# Patient Record
Sex: FEMALE | Race: WHITE | NOT HISPANIC OR LATINO | Employment: OTHER | URBAN - METROPOLITAN AREA
[De-identification: names, ages, dates, MRNs, and addresses within clinical notes are randomized per-mention and may not be internally consistent; named-entity substitution may affect disease eponyms.]

---

## 2021-06-14 ENCOUNTER — TELEPHONE (OUTPATIENT)
Dept: CARDIOLOGY | Facility: CLINIC | Age: 76
End: 2021-06-14

## 2021-08-11 ENCOUNTER — TELEPHONE (OUTPATIENT)
Dept: CARDIOLOGY | Facility: CLINIC | Age: 76
End: 2021-08-11

## 2021-08-12 ENCOUNTER — OFFICE VISIT (OUTPATIENT)
Dept: CARDIOLOGY | Facility: CLINIC | Age: 76
End: 2021-08-12
Payer: MEDICARE

## 2021-08-12 VITALS
BODY MASS INDEX: 20.6 KG/M2 | DIASTOLIC BLOOD PRESSURE: 101 MMHG | HEART RATE: 104 BPM | SYSTOLIC BLOOD PRESSURE: 192 MMHG | HEIGHT: 60 IN | WEIGHT: 104.94 LBS

## 2021-08-12 DIAGNOSIS — E78.2 MIXED HYPERLIPIDEMIA: ICD-10-CM

## 2021-08-12 DIAGNOSIS — Q87.89: ICD-10-CM

## 2021-08-12 DIAGNOSIS — R09.89 LABILE HYPERTENSION: Primary | ICD-10-CM

## 2021-08-12 DIAGNOSIS — I10 ESSENTIAL HYPERTENSION: ICD-10-CM

## 2021-08-12 PROCEDURE — 99205 OFFICE O/P NEW HI 60 MIN: CPT | Mod: S$PBB,,, | Performed by: INTERNAL MEDICINE

## 2021-08-12 PROCEDURE — 99999 PR PBB SHADOW E&M-EST. PATIENT-LVL III: CPT | Mod: PBBFAC,,, | Performed by: INTERNAL MEDICINE

## 2021-08-12 PROCEDURE — 93010 EKG 12-LEAD: ICD-10-PCS | Mod: ,,, | Performed by: INTERNAL MEDICINE

## 2021-08-12 PROCEDURE — 99999 PR PBB SHADOW E&M-EST. PATIENT-LVL III: ICD-10-PCS | Mod: PBBFAC,,, | Performed by: INTERNAL MEDICINE

## 2021-08-12 PROCEDURE — 93005 ELECTROCARDIOGRAM TRACING: CPT | Mod: PO

## 2021-08-12 PROCEDURE — 99213 OFFICE O/P EST LOW 20 MIN: CPT | Mod: PBBFAC,PO | Performed by: INTERNAL MEDICINE

## 2021-08-12 PROCEDURE — 99205 PR OFFICE/OUTPT VISIT, NEW, LEVL V, 60-74 MIN: ICD-10-PCS | Mod: S$PBB,,, | Performed by: INTERNAL MEDICINE

## 2021-08-12 PROCEDURE — 93010 ELECTROCARDIOGRAM REPORT: CPT | Mod: ,,, | Performed by: INTERNAL MEDICINE

## 2021-08-12 RX ORDER — CLONIDINE HYDROCHLORIDE 0.1 MG/1
TABLET ORAL
COMMUNITY
Start: 2021-05-26 | End: 2021-09-15 | Stop reason: SDUPTHER

## 2021-08-12 RX ORDER — CARVEDILOL 12.5 MG/1
TABLET ORAL
COMMUNITY
Start: 2021-03-16 | End: 2021-08-12

## 2021-08-12 RX ORDER — PROPRANOLOL HYDROCHLORIDE 60 MG/1
60 CAPSULE, EXTENDED RELEASE ORAL DAILY
Qty: 90 CAPSULE | Refills: 3 | Status: SHIPPED | OUTPATIENT
Start: 2021-08-12 | End: 2021-10-25 | Stop reason: DRUGHIGH

## 2021-08-12 RX ORDER — LATANOPROST 50 UG/ML
SOLUTION/ DROPS OPHTHALMIC
COMMUNITY
Start: 2021-07-29 | End: 2024-02-06

## 2021-08-12 RX ORDER — METOPROLOL TARTRATE 25 MG/1
25 TABLET, FILM COATED ORAL DAILY
COMMUNITY
Start: 2021-07-29 | End: 2021-08-12

## 2021-08-12 RX ORDER — EZETIMIBE 10 MG/1
10 TABLET ORAL DAILY
COMMUNITY
Start: 2021-05-18

## 2021-08-12 RX ORDER — SODIUM, POTASSIUM,MAG SULFATES 17.5-3.13G
SOLUTION, RECONSTITUTED, ORAL ORAL
COMMUNITY
Start: 2021-04-30 | End: 2023-05-15

## 2021-08-12 RX ORDER — ALPRAZOLAM 0.25 MG/1
TABLET ORAL
COMMUNITY
Start: 2021-06-02

## 2021-08-12 RX ORDER — OMEPRAZOLE 20 MG/1
20 CAPSULE, DELAYED RELEASE ORAL
COMMUNITY
Start: 2021-08-03 | End: 2021-08-12

## 2021-08-12 RX ORDER — SIMVASTATIN 20 MG/1
TABLET, FILM COATED ORAL
COMMUNITY
Start: 2021-04-30

## 2021-08-24 DIAGNOSIS — I10 ESSENTIAL HYPERTENSION: Primary | ICD-10-CM

## 2021-09-07 ENCOUNTER — TELEPHONE (OUTPATIENT)
Dept: CARDIOLOGY | Facility: CLINIC | Age: 76
End: 2021-09-07

## 2021-09-15 RX ORDER — CLONIDINE HYDROCHLORIDE 0.1 MG/1
0.1 TABLET ORAL 2 TIMES DAILY
Qty: 180 TABLET | Refills: 3 | Status: SHIPPED | OUTPATIENT
Start: 2021-09-15 | End: 2022-12-01

## 2021-09-16 ENCOUNTER — CLINICAL SUPPORT (OUTPATIENT)
Dept: CARDIOLOGY | Facility: HOSPITAL | Age: 76
End: 2021-09-16
Attending: INTERNAL MEDICINE
Payer: MEDICARE

## 2021-09-16 DIAGNOSIS — R09.89 LABILE HYPERTENSION: ICD-10-CM

## 2021-09-16 LAB
ABDOMINAL AORTA PROX EDV: 7 CM/S
ABDOMINAL AORTA PROX PSV: 51 CM/S
LEFT RENAL DIST DIAS: 25 CM/S
LEFT RENAL DIST SYS: 84 CM/S
LEFT RENAL MID DIAS: 22 CM/S
LEFT RENAL MID SYS: 79 CM/S
LEFT RENAL ORIGIN DIAS: 26 CM/S
LEFT RENAL ORIGIN SYS: 84 CM/S
LEFT RENAL PROX DIAS: 21 CM/S
LEFT RENAL PROX RAR: 1.45
LEFT RENAL PROX SYS: 74 CM/S
LEFT RENAL ULTRASOUND ACCELERATION TIME MEASUREMENT 1: 66 MS
LEFT RENAL ULTRASOUND ACCELERATION TIME MEASUREMENT 2: 66 MS
LEFT RENAL ULTRASOUND ACCELERATION TIME MEASUREMENT 3: 63 MS
LEFT RENAL ULTRASOUND ACCELERATION TIME MEASUREMENT AVERAGE: 66 MS
LEFT RENAL ULTRASOUND KIDNEY SIZE MEASUREMENT 1: 9.5 CM
LEFT RENAL ULTRASOUND KIDNEY SIZE MEASUREMENT 2: 9.4 CM
LEFT RENAL ULTRASOUND KIDNEY SIZE MEASUREMENT 3: 9.4 CM
LEFT RENAL ULTRASOUND KIDNEY SIZE MEASUREMENT AVERAGE: 9.5 CM
LEFT RENAL ULTRASOUND RESISTIVE INDEX MEASUREMENT 1: 0.59
LEFT RENAL ULTRASOUND RESISTIVE INDEX MEASUREMENT 2: 0.66
LEFT RENAL ULTRASOUND RESISTIVE INDEX MEASUREMENT 3: 0.63
LEFT RENAL ULTRASOUND RESISTIVE INDEX MEASUREMENT AVERAGE: 0.66
OHS CV LEFT RENAL RAR: 1.65
OHS CV RIGHT RENAL RAR: 2.27
OHS CV US LEFT RENAL HIGHEST EDV: 26
OHS CV US LEFT RENAL HIGHEST PSV: 84
OHS CV US RIGHT RENAL HIGHEST EDV: 31
OHS CV US RIGHT RENAL HIGHEST PSV: 116
PROX AORTIC AP: 1.7 CM
PROX AORTIC TRANS: 1.7 CM
RIGHT RENAL DIST DIAS: 31 CM/S
RIGHT RENAL DIST SYS: 116 CM/S
RIGHT RENAL MID DIAS: 24 CM/S
RIGHT RENAL MID SYS: 110 CM/S
RIGHT RENAL ORIGIN DIAS: 24 CM/S
RIGHT RENAL ORIGIN SYS: 82 CM/S
RIGHT RENAL PROX DIAS: 28 CM/S
RIGHT RENAL PROX RAR: 2.14
RIGHT RENAL PROX SYS: 109 CM/S
RIGHT RENAL ULTRASOUND ACCELERATION TIME MEASUREMENT 1: 65 MS
RIGHT RENAL ULTRASOUND ACCELERATION TIME MEASUREMENT 2: 63 MS
RIGHT RENAL ULTRASOUND ACCELERATION TIME MEASUREMENT 3: 66 MS
RIGHT RENAL ULTRASOUND ACCELERATION TIME MEASUREMENT AVERAGE: 66 MS
RIGHT RENAL ULTRASOUND KIDNEY SIZE MEASUREMENT 1: 8.2 CM
RIGHT RENAL ULTRASOUND KIDNEY SIZE MEASUREMENT 2: 8.9 CM
RIGHT RENAL ULTRASOUND KIDNEY SIZE MEASUREMENT 3: 8.9 CM
RIGHT RENAL ULTRASOUND KIDNEY SIZE MEASUREMENT AVERAGE: 8.9 CM
RIGHT RENAL ULTRASOUND RESISTIVE INDEX MEASUREMENT 1: 0.77
RIGHT RENAL ULTRASOUND RESISTIVE INDEX MEASUREMENT 2: 0.7
RIGHT RENAL ULTRASOUND RESISTIVE INDEX MEASUREMENT 3: 0.64
RIGHT RENAL ULTRASOUND RESISTIVE INDEX MEASUREMENT AVERAGE: 0.77

## 2021-09-16 PROCEDURE — 93975 CV US RENAL ARTERY STENOSIS HYPERTENSION COMPLETE (CUPID ONLY): ICD-10-PCS | Mod: 26,,, | Performed by: INTERNAL MEDICINE

## 2021-09-16 PROCEDURE — 93975 VASCULAR STUDY: CPT | Mod: 26,,, | Performed by: INTERNAL MEDICINE

## 2021-09-16 PROCEDURE — 93975 VASCULAR STUDY: CPT | Mod: PO

## 2021-09-20 DIAGNOSIS — H91.90 HEARING DIFFICULTY, UNSPECIFIED LATERALITY: Primary | ICD-10-CM

## 2021-09-21 ENCOUNTER — CLINICAL SUPPORT (OUTPATIENT)
Dept: AUDIOLOGY | Facility: CLINIC | Age: 76
End: 2021-09-21
Payer: MEDICARE

## 2021-09-21 ENCOUNTER — OFFICE VISIT (OUTPATIENT)
Dept: OTOLARYNGOLOGY | Facility: CLINIC | Age: 76
End: 2021-09-21
Payer: MEDICARE

## 2021-09-21 VITALS — BODY MASS INDEX: 20.49 KG/M2 | WEIGHT: 104.94 LBS

## 2021-09-21 DIAGNOSIS — H81.09 MENIERE'S DISEASE, UNSPECIFIED LATERALITY: ICD-10-CM

## 2021-09-21 DIAGNOSIS — H90.3 BILATERAL SENSORINEURAL HEARING LOSS: Primary | ICD-10-CM

## 2021-09-21 DIAGNOSIS — H93.12 TINNITUS, LEFT: ICD-10-CM

## 2021-09-21 PROCEDURE — 99999 PR PBB SHADOW E&M-EST. PATIENT-LVL III: ICD-10-PCS | Mod: PBBFAC,,, | Performed by: OTOLARYNGOLOGY

## 2021-09-21 PROCEDURE — 92567 TYMPANOMETRY: CPT | Mod: PBBFAC,PO | Performed by: AUDIOLOGIST-HEARING AID FITTER

## 2021-09-21 PROCEDURE — 92557 COMPREHENSIVE HEARING TEST: CPT | Mod: PBBFAC,PO | Performed by: AUDIOLOGIST-HEARING AID FITTER

## 2021-09-21 PROCEDURE — 99999 PR PBB SHADOW E&M-EST. PATIENT-LVL I: ICD-10-PCS | Mod: PBBFAC,,,

## 2021-09-21 PROCEDURE — 99213 OFFICE O/P EST LOW 20 MIN: CPT | Mod: PBBFAC,27,PO | Performed by: OTOLARYNGOLOGY

## 2021-09-21 PROCEDURE — 99999 PR PBB SHADOW E&M-EST. PATIENT-LVL I: CPT | Mod: PBBFAC,,,

## 2021-09-21 PROCEDURE — 99211 OFF/OP EST MAY X REQ PHY/QHP: CPT | Mod: PBBFAC,PO

## 2021-09-21 PROCEDURE — 99203 PR OFFICE/OUTPT VISIT, NEW, LEVL III, 30-44 MIN: ICD-10-PCS | Mod: S$PBB,,, | Performed by: OTOLARYNGOLOGY

## 2021-09-21 PROCEDURE — 99999 PR PBB SHADOW E&M-EST. PATIENT-LVL III: CPT | Mod: PBBFAC,,, | Performed by: OTOLARYNGOLOGY

## 2021-09-21 PROCEDURE — 99203 OFFICE O/P NEW LOW 30 MIN: CPT | Mod: S$PBB,,, | Performed by: OTOLARYNGOLOGY

## 2021-10-18 ENCOUNTER — TELEPHONE (OUTPATIENT)
Dept: CARDIOLOGY | Facility: CLINIC | Age: 76
End: 2021-10-18

## 2021-10-22 ENCOUNTER — TELEPHONE (OUTPATIENT)
Dept: CARDIOLOGY | Facility: CLINIC | Age: 76
End: 2021-10-22

## 2021-10-25 ENCOUNTER — TELEPHONE (OUTPATIENT)
Dept: CARDIOLOGY | Facility: CLINIC | Age: 76
End: 2021-10-25
Payer: MEDICARE

## 2021-10-25 RX ORDER — PROPRANOLOL HYDROCHLORIDE 80 MG/1
80 CAPSULE, EXTENDED RELEASE ORAL DAILY
Qty: 90 CAPSULE | Refills: 3 | Status: SHIPPED | OUTPATIENT
Start: 2021-10-25 | End: 2022-12-01

## 2022-11-30 NOTE — PROGRESS NOTES
Subjective:    Patient ID:  Jazlyn Shay is a 77 y.o. female who presents for follow-up of Follow-up      Problem List Items Addressed This Visit          Cardiac/Vascular    Essential hypertension - Primary    Labile hypertension    Mixed hyperlipidemia       HPI    Patient was last seen on 08/12/2021 at which time she was evaluated for labile hypertension with renal artery Doppler.  Metoprolol should propranolol XL and was referred to ENT for evaluation of vertigo symptoms.  Renal artery Doppler negative.  Lipid panel ordered prior to this visit, but not completed.    On assessment today, the patient states that she feels OK today.    No angina.  No shortness of breath.    Home BP - Mostly controlled in the AM, occasional outliers, mostly uncontrolled in the PM with occasional good ones  How is constipation - improved  Getting some issues with lightheadedness just getting out of a chair and sometimes in the morning  Drinking a lot of water.  New PCP is Dr. Magali Du in [place]  Sees HTN specialist Dr. Jaspreet Powell       Objective:     Vitals:    12/01/22 0942   BP: (!) 144/78   BP Location: Left arm   Patient Position: Sitting   BP Method: Medium (Automatic)   Pulse: 75   Weight: 49.5 kg (109 lb 2 oz)   Height: 5' (1.524 m)        Physical Exam  Vitals and nursing note reviewed.   Constitutional:       General: She is not in acute distress.     Appearance: She is well-developed.   HENT:      Head: Normocephalic and atraumatic.   Neck:      Vascular: No JVD.   Cardiovascular:      Rate and Rhythm: Normal rate and regular rhythm.      Heart sounds: Normal heart sounds. No murmur heard.    No friction rub. No gallop.   Pulmonary:      Effort: Pulmonary effort is normal. No respiratory distress.      Breath sounds: Normal breath sounds. No wheezing or rales.   Abdominal:      General: Bowel sounds are normal.      Palpations: Abdomen is soft.      Tenderness: There is no abdominal tenderness. There is no  guarding or rebound.   Musculoskeletal:         General: No tenderness.      Cervical back: Neck supple.   Skin:     General: Skin is warm and dry.   Neurological:      Mental Status: She is alert and oriented to person, place, and time.   Psychiatric:         Behavior: Behavior normal.           Current Outpatient Medications   Medication Instructions    ALPRAZolam (XANAX) 0.25 MG tablet No dose, route, or frequency recorded.    amLODIPine (NORVASC) 5 mg, Oral, 2 times daily, Half twice a day    cloNIDine (CATAPRES) 0.1 mg, Oral, 2 times daily    ezetimibe (ZETIA) 10 mg, Oral, Daily    hydrALAZINE (APRESOLINE) 25 mg, Oral, Once as needed    latanoprost 0.005 % ophthalmic solution No dose, route, or frequency recorded.    MULTIVITAMIN ORAL Oral    nebivoloL (BYSTOLIC) 10 mg, Oral, 2 times daily, Half twice a day    propranoloL (INDERAL LA) 80 mg, Oral, Daily    simvastatin (ZOCOR) 20 MG tablet No dose, route, or frequency recorded.    SUPREP BOWEL PREP KIT 17.5-3.13-1.6 gram SolR No dose, route, or frequency recorded.       Lipid Panel:   No results found for: CHOL, HDL, LDLCALC, TRIG, CHOLHDL    The ASCVD Risk score (Allen DK, et al., 2019) failed to calculate for the following reasons:    Cannot find a previous HDL lab    Cannot find a previous total cholesterol lab    All pertinent labs, imaging, and EKGs reviewed.  Patient's most recent EKG tracing was personally interpreted by this provider.    Most Recent Echocardiogram Results  No results found for this or any previous visit.        Most Recent EKG  Results for orders placed or performed in visit on 08/12/21   IN OFFICE EKG 12-LEAD (to [place])    Collection Time: 08/12/21  2:34 PM    Narrative    Test Reason : 8/24    Vent. Rate : 094 BPM     Atrial Rate : 094 BPM     P-R Int : 172 ms          QRS Dur : 074 ms      QT Int : 362 ms       P-R-T Axes : 059 062 023 degrees     QTc Int : 452 ms    Normal sinus rhythm  Nonspecific T wave abnormality  Cannot exclude  Ischemia  Abnormal ECG  No previous ECGs available  Confirmed by BERNY MUJICA MD ([id number]) on 8/24/2021 3:29:46 PM    Referred By: RJ   SELF           Confirmed By:BERNY MUJICA MD       No valid procedures specified.   No results found for this or any previous visit.    No valid procedures specified.      Assessment:       1. Essential hypertension    2. Mixed hyperlipidemia    3. Labile hypertension         Plan:     Symptoms of labile blood pressure  BP/Pulse OK today  Most recent echocardiogram reviewed personally     Current HTN regimen is:  Amlodipine 2.5mg PO BID  Nebivilol 5mg PO BID  Hydralazine as needed  Continue escitalopram, had recent increase  Continue simvastatin 20 mg PO Daily   Recommend fiber capsules daily   Diet/exercise/emotional status log    Continue other cardiac medications  Mediterranean Diet/Cardiovascular Exercise Program    Greater than 40 minutes of total time was spent for this patient on the date of the encounter including chart review, interview, and medical decision making.    Patient queried and all questions were answered.    F/u in 6 months to reassess      Signed:    Gerhard Cobos MD  12/1/2022 8:21 AM

## 2022-12-01 ENCOUNTER — OFFICE VISIT (OUTPATIENT)
Dept: CARDIOLOGY | Facility: CLINIC | Age: 77
End: 2022-12-01
Payer: MEDICARE

## 2022-12-01 VITALS
WEIGHT: 109.13 LBS | DIASTOLIC BLOOD PRESSURE: 78 MMHG | HEIGHT: 60 IN | HEART RATE: 75 BPM | SYSTOLIC BLOOD PRESSURE: 144 MMHG | BODY MASS INDEX: 21.42 KG/M2

## 2022-12-01 DIAGNOSIS — E78.2 MIXED HYPERLIPIDEMIA: ICD-10-CM

## 2022-12-01 DIAGNOSIS — R09.89 LABILE HYPERTENSION: ICD-10-CM

## 2022-12-01 DIAGNOSIS — I10 ESSENTIAL HYPERTENSION: Primary | ICD-10-CM

## 2022-12-01 PROCEDURE — 99999 PR PBB SHADOW E&M-EST. PATIENT-LVL III: ICD-10-PCS | Mod: PBBFAC,,, | Performed by: INTERNAL MEDICINE

## 2022-12-01 PROCEDURE — 99999 PR PBB SHADOW E&M-EST. PATIENT-LVL III: CPT | Mod: PBBFAC,,, | Performed by: INTERNAL MEDICINE

## 2022-12-01 PROCEDURE — 99213 OFFICE O/P EST LOW 20 MIN: CPT | Mod: PBBFAC,PO | Performed by: INTERNAL MEDICINE

## 2022-12-01 PROCEDURE — 99215 PR OFFICE/OUTPT VISIT, EST, LEVL V, 40-54 MIN: ICD-10-PCS | Mod: S$PBB,,, | Performed by: INTERNAL MEDICINE

## 2022-12-01 PROCEDURE — 99215 OFFICE O/P EST HI 40 MIN: CPT | Mod: S$PBB,,, | Performed by: INTERNAL MEDICINE

## 2022-12-01 RX ORDER — AMLODIPINE BESYLATE 5 MG/1
5 TABLET ORAL 2 TIMES DAILY
COMMUNITY

## 2022-12-01 RX ORDER — NEBIVOLOL 10 MG/1
10 TABLET ORAL 2 TIMES DAILY
COMMUNITY
End: 2023-05-15 | Stop reason: ALTCHOICE

## 2022-12-01 RX ORDER — HYDRALAZINE HYDROCHLORIDE 25 MG/1
25 TABLET, FILM COATED ORAL EVERY 8 HOURS PRN
COMMUNITY

## 2023-04-17 ENCOUNTER — TELEPHONE (OUTPATIENT)
Dept: CARDIOLOGY | Facility: CLINIC | Age: 78
End: 2023-04-17
Payer: MEDICARE

## 2023-04-17 NOTE — TELEPHONE ENCOUNTER
----- Message from Lanette Navas sent at 4/17/2023  9:49 AM CDT -----  Contact: pt  Type: Needs Medical Advice  Who Called:  pt     Best Call Back Number: [phone]    Additional Information: pt would like to speak with nurse she states she went to the er Saturday for high pressure and they suggested she get a stress test. Please advise.

## 2023-05-15 ENCOUNTER — OFFICE VISIT (OUTPATIENT)
Dept: CARDIOLOGY | Facility: CLINIC | Age: 78
End: 2023-05-15
Payer: MEDICARE

## 2023-05-15 VITALS
HEART RATE: 77 BPM | BODY MASS INDEX: 21.99 KG/M2 | SYSTOLIC BLOOD PRESSURE: 118 MMHG | HEIGHT: 60 IN | WEIGHT: 112 LBS | DIASTOLIC BLOOD PRESSURE: 74 MMHG

## 2023-05-15 DIAGNOSIS — I10 ESSENTIAL HYPERTENSION: ICD-10-CM

## 2023-05-15 DIAGNOSIS — R09.89 LABILE HYPERTENSION: ICD-10-CM

## 2023-05-15 PROCEDURE — 99214 OFFICE O/P EST MOD 30 MIN: CPT | Mod: S$PBB,,,

## 2023-05-15 PROCEDURE — 99999 PR PBB SHADOW E&M-EST. PATIENT-LVL III: ICD-10-PCS | Mod: PBBFAC,,,

## 2023-05-15 PROCEDURE — 99999 PR PBB SHADOW E&M-EST. PATIENT-LVL III: CPT | Mod: PBBFAC,,,

## 2023-05-15 PROCEDURE — 99213 OFFICE O/P EST LOW 20 MIN: CPT | Mod: PBBFAC,PO

## 2023-05-15 PROCEDURE — 99214 PR OFFICE/OUTPT VISIT, EST, LEVL IV, 30-39 MIN: ICD-10-PCS | Mod: S$PBB,,,

## 2023-05-15 RX ORDER — CARVEDILOL 6.25 MG/1
6.25 TABLET ORAL 3 TIMES DAILY
COMMUNITY

## 2023-05-15 RX ORDER — ESCITALOPRAM OXALATE 20 MG/1
20 TABLET ORAL DAILY
COMMUNITY

## 2023-05-15 NOTE — ASSESSMENT & PLAN NOTE
BP much better   Continue amlodipine 5 mg BID   Continue coreg 6.25 mg BID   Hydralizine PRN   Low NA diet   Monitor closely

## 2023-05-15 NOTE — PROGRESS NOTES
Subjective:    Patient ID:  Jazlyn Shay is a 77 y.o. female patient here for evaluation Follow-up    History of Present Illness:     Mrs. Hobson is a pleasant 77 year old F who follows with Dr. Cobos here today for a follow up. She went to [facility] (records not available to review) in April for a systolic BP of greater than 200. They did not put her on any drips or admit her as she took several PO hydralazine at home. Her hypertension is asymptomatic. They did suggest she had a stress test for an unknown reason. She has not had an echo. She has no CV complaints.     + fam hx:  Mother CVA/MI 86   Father CHF       Other Most Recent Cardiology Results  Results for orders placed in visit on 09/16/21    CV US Renal Artery Stenosis Hypertension Complete    Narrative  · There is insignificant stenosis (0-59%) in the Right Renal Artery.  · Elevated right renal resistive index suggestive of intrinsic kidney disease.  · Right kidney 8.90 cm.  · There is insignificant stenosis (0-59%) in the Left Renal Artery.  · Left kidney 9.50 cm.      REVIEW OF SYSTEMS: As noted in HPI   CARDIOVASCULAR: No recent chest pain, palpitations, arm, neck, or jaw pain.  RESPIRATORY: No recent fever, cough chills, SOB.  : No blood in the urine  GI: No nausea, vomiting, or blood in stool.   MUSCULOSKELETAL: No myalgias or falls.   NEURO: No syncope, lightheadedness, or dizziness.  EYES: No sudden changes in vision.     No past medical history on file.  No past surgical history on file.  Social History     Tobacco Use    Smoking status: Never    Smokeless tobacco: Never         Objective      Vitals:    05/15/23 1507   BP: 118/74   Pulse: 77       LAST EKG  Results for orders placed or performed in visit on 08/12/21   IN OFFICE EKG 12-LEAD (to [place])    Collection Time: 08/12/21  2:34 PM    Narrative    Test Reason : 8/24    Vent. Rate : 094 BPM     Atrial Rate : 094 BPM     P-R Int : 172 ms          QRS Dur : 074 ms      QT Int  : 362 ms       P-R-T Axes : 059 062 023 degrees     QTc Int : 452 ms    Normal sinus rhythm  Nonspecific T wave abnormality  Cannot exclude Ischemia  Abnormal ECG  No previous ECGs available  Confirmed by BERNY MUJICA MD ([id number]) on 8/24/2021 3:29:46 PM    Referred By: RJ   SELF           Confirmed By:BRENY MUJICA MD     LIPIDS - LAST 2   No results found for: CHOL, HDL, LDLCALC, TRIG, CHOLHDL  CARDIAC PROFILE - LAST 2  No results found for: BNP, CPK, CPKMB, LDH, TROPONINI   CBC - LAST 2  No results found for: WBC, RBC, HGB, HCT, PLT  No results found for: LABPT, INR, APTT  CHEMISTRY - LAST 2  No results found for: NA, K, CL, CO2, ANIONGAP, BUN, CREATININE, GLU, CALCIUM, PH, MG, ALBUMIN, PROT, ALKPHOS, ALT, AST, BILITOT   ENDOCRINE - LAST 2  No results found for: HGBA1C, TSH     PHYSICAL EXAM  CONSTITUTIONAL: Well built, well nourished in no apparent distress  NECK: no carotid bruit, no JVD  LUNGS: CTA  CHEST WALL: no tenderness  HEART: regular rate and rhythm, S1, S2 normal, no murmur, click, rub or gallop   ABDOMEN: soft, non-tender; bowel sounds normal; no masses,  no organomegaly  EXTREMITIES: Extremities normal, no edema, no calf tenderness noted  NEURO: AAO X 3    I HAVE REVIEWED :    The vital signs, most recent cardiac testing, and most recent pertinent non-cardiology provider notes.    Current Outpatient Medications   Medication Instructions    ALPRAZolam (XANAX) 0.25 MG tablet No dose, route, or frequency recorded.    amLODIPine (NORVASC) 5 mg, Oral, 2 times daily, Half twice a day    ascorbic acid (vitamin C) (VITAMIN C) 100 mg, Oral, Daily    carvediloL (COREG) 6.25 mg, Oral, 2 times daily with meals    EScitalopram oxalate (LEXAPRO) 20 mg, Oral, Daily    ezetimibe (ZETIA) 10 mg, Oral, Daily    hydrALAZINE (APRESOLINE) 25 mg, Oral, Once as needed    latanoprost 0.005 % ophthalmic solution No dose, route, or frequency recorded.    MULTIVITAMIN ORAL Oral    simvastatin (ZOCOR) 20 MG tablet No  dose, route, or frequency recorded.      Assessment & Plan     Labile hypertension  Sees a BP specialists in [place]   Will complete echo to rule out structural disease as cause of labile HTN     Essential hypertension  BP much better   Continue amlodipine 5 mg BID   Continue coreg 6.25 mg BID   Hydralizine PRN   Low NA diet   Monitor closely         Follow up in about 6 months (around 11/15/2023).     Lolita Oscar PA-C   [facility]   Office: [phone]

## 2023-05-15 NOTE — ASSESSMENT & PLAN NOTE
Sees a BP specialists in [place]   Will complete echo to rule out structural disease as cause of labile HTN

## 2023-05-17 ENCOUNTER — CLINICAL SUPPORT (OUTPATIENT)
Dept: CARDIOLOGY | Facility: HOSPITAL | Age: 78
End: 2023-05-17
Payer: MEDICARE

## 2023-05-17 VITALS — HEART RATE: 66 BPM | WEIGHT: 111 LBS | BODY MASS INDEX: 21.79 KG/M2 | HEIGHT: 60 IN

## 2023-05-17 DIAGNOSIS — I10 ESSENTIAL HYPERTENSION: ICD-10-CM

## 2023-05-17 DIAGNOSIS — R09.89 LABILE HYPERTENSION: ICD-10-CM

## 2023-05-17 PROCEDURE — 93306 TTE W/DOPPLER COMPLETE: CPT | Mod: 26,,, | Performed by: INTERNAL MEDICINE

## 2023-05-17 PROCEDURE — 93306 TTE W/DOPPLER COMPLETE: CPT | Mod: PO

## 2023-05-17 PROCEDURE — 93306 ECHO (CUPID ONLY): ICD-10-PCS | Mod: 26,,, | Performed by: INTERNAL MEDICINE

## 2023-05-18 LAB
ASCENDING AORTA: 2.63 CM
AV INDEX (PROSTH): 0.76
AV MEAN GRADIENT: 4 MMHG
AV PEAK GRADIENT: 6 MMHG
AV REGURGITATION PRESSURE HALF TIME: 708.25 MS
AV VALVE AREA: 2.3 CM2
AV VELOCITY RATIO: 0.84
BSA FOR ECHO PROCEDURE: 1.46 M2
CV ECHO LV RWT: 0.42 CM
DOP CALC AO PEAK VEL: 1.27 M/S
DOP CALC AO VTI: 33.6 CM
DOP CALC LVOT AREA: 3 CM2
DOP CALC LVOT DIAMETER: 1.97 CM
DOP CALC LVOT PEAK VEL: 1.07 M/S
DOP CALC LVOT STROKE VOLUME: 77.38 CM3
DOP CALCLVOT PEAK VEL VTI: 25.4 CM
E WAVE DECELERATION TIME: 178.41 MSEC
E/A RATIO: 0.64
E/E' RATIO: 7.05 M/S
ECHO LV POSTERIOR WALL: 0.82 CM (ref 0.6–1.1)
EJECTION FRACTION: 60 %
FRACTIONAL SHORTENING: 28 % (ref 28–44)
INTERVENTRICULAR SEPTUM: 0.84 CM (ref 0.6–1.1)
IVRT: 111.32 MSEC
LA MAJOR: 4.6 CM
LA MINOR: 4.53 CM
LA WIDTH: 4.1 CM
LEFT ATRIUM SIZE: 2.85 CM
LEFT ATRIUM VOLUME INDEX: 31.3 ML/M2
LEFT ATRIUM VOLUME: 45.34 CM3
LEFT INTERNAL DIMENSION IN SYSTOLE: 2.79 CM (ref 2.1–4)
LEFT VENTRICLE DIASTOLIC VOLUME INDEX: 44.9 ML/M2
LEFT VENTRICLE DIASTOLIC VOLUME: 65.1 ML
LEFT VENTRICLE MASS INDEX: 64 G/M2
LEFT VENTRICLE SYSTOLIC VOLUME INDEX: 20.2 ML/M2
LEFT VENTRICLE SYSTOLIC VOLUME: 29.23 ML
LEFT VENTRICULAR INTERNAL DIMENSION IN DIASTOLE: 3.88 CM (ref 3.5–6)
LEFT VENTRICULAR MASS: 93.47 G
LV LATERAL E/E' RATIO: 6.09 M/S
LV SEPTAL E/E' RATIO: 8.38 M/S
LVOT MG: 2.44 MMHG
LVOT MV: 0.72 CM/S
MV PEAK A VEL: 1.05 M/S
MV PEAK E VEL: 0.67 M/S
MV STENOSIS PRESSURE HALF TIME: 51.74 MS
MV VALVE AREA P 1/2 METHOD: 4.25 CM2
PISA AR MAX VEL: 4.4 M/S
PISA MRMAX VEL: 5.65 M/S
PISA TR MAX VEL: 2.3 M/S
PULM VEIN S/D RATIO: 1.45
PV PEAK D VEL: 0.44 M/S
PV PEAK S VEL: 0.64 M/S
RA MAJOR: 4.38 CM
RA PRESSURE: 3 MMHG
RA WIDTH: 2.3 CM
RIGHT VENTRICULAR END-DIASTOLIC DIMENSION: 3.03 CM
RIGHT VENTRICULAR LENGTH IN DIASTOLE (APICAL 4-CHAMBER VIEW): 3.62 CM
RV MID DIAMA: 2.6 CM
RV TISSUE DOPPLER FREE WALL SYSTOLIC VELOCITY 1 (APICAL 4 CHAMBER VIEW): 0.01 CM/S
SINUS: 2.34 CM
STJ: 2.34 CM
TDI LATERAL: 0.11 M/S
TDI SEPTAL: 0.08 M/S
TDI: 0.1 M/S
TR MAX PG: 21 MMHG
TRICUSPID ANNULAR PLANE SYSTOLIC EXCURSION: 2.51 CM
TV REST PULMONARY ARTERY PRESSURE: 24 MMHG

## 2023-06-05 NOTE — PROGRESS NOTES
Subjective:    Patient ID:  Jazlyn hSay is a 77 y.o. female who presents for follow-up of Hyperlipidemia and Hypertension      Problem List Items Addressed This Visit          Cardiac/Vascular    Essential hypertension - Primary    Labile hypertension    Mixed hyperlipidemia       HPI    Patient was last seen on 12/01/2022 at which time she was doing okay from a cardiac standpoint.  Still with some labile blood pressure.  Was continuing on escitalopram.  Had echocardiogram prior to this visit which showed preserved ejection fraction without acute abnormalities.    On assessment today, the patient states that she feels OK.    No chest pain.  No shortness of breath.  Home BP - Still some highs and lows, but less often now.  Frequency of hydralazine use - 2-3x a month  Getting lows about every other day       Objective:     Vitals:    06/07/23 0934   BP: 122/76   BP Location: Right arm   Patient Position: Sitting   BP Method: Medium (Automatic)   Pulse: 69   Weight: 50.8 kg (111 lb 15.9 oz)   Height: 5' (1.524 m)       BP Readings from Last 5 Encounters:   06/07/23 122/76   05/15/23 118/74   12/01/22 (!) 144/78   08/12/21 (!) 192/101        Physical Exam  Vitals and nursing note reviewed.   Constitutional:       General: She is not in acute distress.     Appearance: She is well-developed.   HENT:      Head: Normocephalic and atraumatic.   Neck:      Vascular: No JVD.   Cardiovascular:      Rate and Rhythm: Normal rate and regular rhythm.      Heart sounds: Normal heart sounds. No murmur heard.    No friction rub. No gallop.   Pulmonary:      Effort: Pulmonary effort is normal. No respiratory distress.      Breath sounds: Normal breath sounds. No wheezing or rales.   Abdominal:      General: Bowel sounds are normal.      Palpations: Abdomen is soft.      Tenderness: There is no abdominal tenderness. There is no guarding or rebound.   Musculoskeletal:         General: No tenderness.      Cervical back: Neck supple.    Skin:     General: Skin is warm and dry.   Neurological:      Mental Status: She is alert and oriented to person, place, and time.   Psychiatric:         Behavior: Behavior normal.           Current Outpatient Medications   Medication Instructions    ALPRAZolam (XANAX) 0.25 MG tablet No dose, route, or frequency recorded.    amLODIPine (NORVASC) 5 mg, Oral, 2 times daily, Half twice a day    ascorbic acid (vitamin C) (VITAMIN C) 100 mg, Oral, Daily    carvediloL (COREG) 6.25 mg, Oral, 2 times daily with meals    EScitalopram oxalate (LEXAPRO) 20 mg, Oral, Daily    ezetimibe (ZETIA) 10 mg, Oral, Daily    hydrALAZINE (APRESOLINE) 25 mg, Oral, Once as needed    latanoprost 0.005 % ophthalmic solution No dose, route, or frequency recorded.    MULTIVITAMIN ORAL Oral    simvastatin (ZOCOR) 20 MG tablet No dose, route, or frequency recorded.       Lipid Panel:   No results found for: CHOL, HDL, LDLCALC, TRIG, CHOLHDL    The ASCVD Risk score (Bakari DK, et al., 2019) failed to calculate for the following reasons:    Cannot find a previous HDL lab    Cannot find a previous total cholesterol lab    All pertinent labs, imaging, and EKGs reviewed.  Patient's most recent EKG tracing was personally interpreted by this provider.    Most Recent EKG Results  Results for orders placed or performed in visit on 08/12/21   IN OFFICE EKG 12-LEAD (to [place])    Collection Time: 08/12/21  2:34 PM    Narrative    Test Reason : 8/24    Vent. Rate : 094 BPM     Atrial Rate : 094 BPM     P-R Int : 172 ms          QRS Dur : 074 ms      QT Int : 362 ms       P-R-T Axes : 059 062 023 degrees     QTc Int : 452 ms    Normal sinus rhythm  Nonspecific T wave abnormality  Cannot exclude Ischemia  Abnormal ECG  No previous ECGs available  Confirmed by BERNY MUJICA MD ([id number]) on 8/24/2021 3:29:46 PM    Referred By: RJ   SELF           Confirmed By:BERNY MUJICA MD       Most Recent Echocardiogram Results  Results for orders placed in visit on  05/17/23    Echo    Interpretation Summary  · The left ventricle is normal in size with normal systolic function.  · The estimated ejection fraction is 60%.  · Normal left ventricular diastolic function.  · Normal right ventricular size with normal right ventricular systolic function.  · Normal central venous pressure (3 mmHg).  · The estimated PA systolic pressure is 24 mmHg.      Most Recent Nuclear Stress Test Results  No results found for this or any previous visit.      Most Recent Cardiac PET Stress Test Results  No results found for this or any previous visit.      Most Recent Cardiovascular Angiogram results  No results found for this or any previous visit.      Other Most Recent Cardiology Results  Results for orders placed in visit on 09/16/21    CV US Renal Artery Stenosis Hypertension Complete    Narrative  · There is insignificant stenosis (0-59%) in the Right Renal Artery.  · Elevated right renal resistive index suggestive of intrinsic kidney disease.  · Right kidney 8.90 cm.  · There is insignificant stenosis (0-59%) in the Left Renal Artery.  · Left kidney 9.50 cm.        Assessment:       1. Essential hypertension    2. Mixed hyperlipidemia    3. Labile hypertension         Plan:     Symptoms OK today  BP/Pulse OK today  Most recent echocardiogram reviewed personally     Continue amlodipine 2.5 mg PO BID, may try to hold PM dose to mitigate AM lows  Continue carvedilol 6.25 mg PO BID  Continue escitalopram 20 mg PO Daily   Continue Zetia 10 mg PO Daily   Continue hydralazine 25 mg PO t.i.d. PRN systolic blood pressure persistently greater than 165   Continue simvastatin 20 mg PO Daily    Continue other cardiac medications  Mediterranean Diet/Cardiovascular Exercise Program    Patient queried and all questions were answered.    F/u in 6 months to reassess      Signed:    Gerhard Cobos MD  6/7/2023 12:07 PM

## 2023-06-07 ENCOUNTER — OFFICE VISIT (OUTPATIENT)
Dept: CARDIOLOGY | Facility: CLINIC | Age: 78
End: 2023-06-07
Payer: MEDICARE

## 2023-06-07 VITALS
DIASTOLIC BLOOD PRESSURE: 76 MMHG | WEIGHT: 112 LBS | BODY MASS INDEX: 21.99 KG/M2 | HEART RATE: 69 BPM | HEIGHT: 60 IN | SYSTOLIC BLOOD PRESSURE: 122 MMHG

## 2023-06-07 DIAGNOSIS — R09.89 LABILE HYPERTENSION: ICD-10-CM

## 2023-06-07 DIAGNOSIS — I10 ESSENTIAL HYPERTENSION: Primary | ICD-10-CM

## 2023-06-07 DIAGNOSIS — E78.2 MIXED HYPERLIPIDEMIA: ICD-10-CM

## 2023-06-07 PROCEDURE — 99213 OFFICE O/P EST LOW 20 MIN: CPT | Mod: PBBFAC,PO | Performed by: INTERNAL MEDICINE

## 2023-06-07 PROCEDURE — 99214 PR OFFICE/OUTPT VISIT, EST, LEVL IV, 30-39 MIN: ICD-10-PCS | Mod: S$PBB,,, | Performed by: INTERNAL MEDICINE

## 2023-06-07 PROCEDURE — 99999 PR PBB SHADOW E&M-EST. PATIENT-LVL III: ICD-10-PCS | Mod: PBBFAC,,, | Performed by: INTERNAL MEDICINE

## 2023-06-07 PROCEDURE — 99999 PR PBB SHADOW E&M-EST. PATIENT-LVL III: CPT | Mod: PBBFAC,,, | Performed by: INTERNAL MEDICINE

## 2023-06-07 PROCEDURE — 99214 OFFICE O/P EST MOD 30 MIN: CPT | Mod: S$PBB,,, | Performed by: INTERNAL MEDICINE

## 2023-09-22 ENCOUNTER — TELEPHONE (OUTPATIENT)
Dept: CARDIOLOGY | Facility: CLINIC | Age: 78
End: 2023-09-22
Payer: MEDICARE

## 2023-09-22 DIAGNOSIS — I49.9 IRREGULAR HEART BEAT: Primary | ICD-10-CM

## 2023-09-22 DIAGNOSIS — I49.8 OTHER SPECIFIED CARDIAC ARRHYTHMIAS: ICD-10-CM

## 2023-09-22 NOTE — TELEPHONE ENCOUNTER
Please advise: a week ago pt was taking her blood pressure and the machine said her heart rate was irregular. Rate was in the 80s. She does not remember the blood pressure. She saw her PCP today who ordered an EKG (had not been read when pt left Dr Office) and told pt to have her cardiologist order a heart monitor

## 2023-12-21 ENCOUNTER — OFFICE VISIT (OUTPATIENT)
Dept: CARDIOLOGY | Facility: CLINIC | Age: 78
End: 2023-12-21
Payer: MEDICARE

## 2023-12-21 VITALS
WEIGHT: 112.44 LBS | HEART RATE: 84 BPM | DIASTOLIC BLOOD PRESSURE: 84 MMHG | SYSTOLIC BLOOD PRESSURE: 156 MMHG | BODY MASS INDEX: 21.96 KG/M2

## 2023-12-21 DIAGNOSIS — I10 ESSENTIAL HYPERTENSION: ICD-10-CM

## 2023-12-21 DIAGNOSIS — E78.2 MIXED HYPERLIPIDEMIA: ICD-10-CM

## 2023-12-21 DIAGNOSIS — R09.89 LABILE HYPERTENSION: Primary | ICD-10-CM

## 2023-12-21 PROCEDURE — 99212 OFFICE O/P EST SF 10 MIN: CPT | Mod: PBBFAC,PO

## 2023-12-21 PROCEDURE — 99214 OFFICE O/P EST MOD 30 MIN: CPT | Mod: S$PBB,,,

## 2023-12-21 PROCEDURE — 99999 PR PBB SHADOW E&M-EST. PATIENT-LVL II: ICD-10-PCS | Mod: PBBFAC,,,

## 2023-12-21 PROCEDURE — 99214 PR OFFICE/OUTPT VISIT, EST, LEVL IV, 30-39 MIN: ICD-10-PCS | Mod: S$PBB,,,

## 2023-12-21 PROCEDURE — 99999 PR PBB SHADOW E&M-EST. PATIENT-LVL II: CPT | Mod: PBBFAC,,,

## 2023-12-21 NOTE — PROGRESS NOTES
Subjective:    Patient ID:  Jazlyn Shay is a 78 y.o. female patient here for evaluation Hypertension    History of Present Illness:     Mrs. Shay is a 78 year old F who follows with Dr. Cobos here today to discuss her labile HTN.       Most Recent Echocardiogram Results  Results for orders placed in visit on 05/17/23    Echo    Interpretation Summary  · The left ventricle is normal in size with normal systolic function.  · The estimated ejection fraction is 60%.  · Normal left ventricular diastolic function.  · Normal right ventricular size with normal right ventricular systolic function.  · Normal central venous pressure (3 mmHg).  · The estimated PA systolic pressure is 24 mmHg.      Most Recent Nuclear Stress Test Results  No results found for this or any previous visit.      Most Recent Cardiac PET Stress Test Results  No results found for this or any previous visit.      Most Recent Cardiovascular Angiogram results  No results found for this or any previous visit.      Other Most Recent Cardiology Results  Results for orders placed in visit on 09/16/21    CV US Renal Artery Stenosis Hypertension Complete    Narrative  · There is insignificant stenosis (0-59%) in the Right Renal Artery.  · Elevated right renal resistive index suggestive of intrinsic kidney disease.  · Right kidney 8.90 cm.  · There is insignificant stenosis (0-59%) in the Left Renal Artery.  · Left kidney 9.50 cm.      REVIEW OF SYSTEMS: As noted in HPI   CARDIOVASCULAR: No recent chest pain, palpitations, arm/neck/jaw pain, or edema.  RESPIRATORY: No recent fever, cough, SOB.  : No blood in the urine  GI: No reflux, nausea, vomiting, or blood in stool.   MUSCULOSKELETAL: No falls.   NEURO: No headaches, syncope, or dizziness.  EYES: No sudden changes in vision.     No past medical history on file.  No past surgical history on file.  Social History     Tobacco Use    Smoking status: Never    Smokeless tobacco: Never        "  Objective      Vitals:    12/21/23 1444   BP: (!) 156/84   Pulse: 84       LAST EKG  Results for orders placed or performed in visit on 08/12/21   IN OFFICE EKG 12-LEAD (to [place])    Collection Time: 08/12/21  2:34 PM    Narrative    Test Reason : 8/24    Vent. Rate : 094 BPM     Atrial Rate : 094 BPM     P-R Int : 172 ms          QRS Dur : 074 ms      QT Int : 362 ms       P-R-T Axes : 059 062 023 degrees     QTc Int : 452 ms    Normal sinus rhythm  Nonspecific T wave abnormality  Cannot exclude Ischemia  Abnormal ECG  No previous ECGs available  Confirmed by BERNY MUJICA MD ([id number]) on 8/24/2021 3:29:46 PM    Referred By: RJ   SELF           Confirmed By:BERNY MUJICA MD     LIPIDS - LAST 2   No results found for: "CHOL", "HDL", "LDLCALC", "TRIG", "CHOLHDL"  CARDIAC PROFILE - LAST 2  No results found for: "BNP", "CPK", "CPKMB", "LDH", "TROPONINI"   CBC - LAST 2  No results found for: "WBC", "RBC", "HGB", "HCT", "PLT"  No results found for: "LABPT", "INR", "APTT"  CHEMISTRY - LAST 2  No results found for: "NA", "K", "CL", "CO2", "ANIONGAP", "BUN", "CREATININE", "GLU", "CALCIUM", "PH", "MG", "ALBUMIN", "PROT", "ALKPHOS", "ALT", "AST", "BILITOT"   ENDOCRINE - LAST 2  No results found for: "HGBA1C", "TSH"     PHYSICAL EXAM  CONSTITUTIONAL: Well built, well nourished in no apparent distress  NECK: no carotid bruit, no JVD  LUNGS: CTA  CHEST WALL: no tenderness  HEART: regular rate and rhythm, S1, S2 normal, no murmur, click, rub or gallop   ABDOMEN: soft, non-tender; bowel sounds normal; no masses,  no organomegaly  EXTREMITIES: Extremities normal, no edema, no calf tenderness noted  NEURO: AAO X 3    I HAVE REVIEWED :    The vital signs, most recent cardiac testing, and most recent pertinent non-cardiology provider notes.    Current Outpatient Medications   Medication Instructions    ALPRAZolam (XANAX) 0.25 MG tablet No dose, route, or frequency recorded.    amLODIPine (NORVASC) 5 mg, Oral, 2 times daily, " Half twice a day    ascorbic acid (vitamin C) (VITAMIN C) 100 mg, Oral, Daily    carvediloL (COREG) 6.25 mg, Oral, 2 times daily with meals    EScitalopram oxalate (LEXAPRO) 20 mg, Oral, Daily    ezetimibe (ZETIA) 10 mg, Oral, Daily    hydrALAZINE (APRESOLINE) 25 mg, Oral, Once as needed    latanoprost 0.005 % ophthalmic solution No dose, route, or frequency recorded.    MULTIVITAMIN ORAL Oral    simvastatin (ZOCOR) 20 MG tablet No dose, route, or frequency recorded.      Assessment & Plan     1. Labile hypertension  With one episode of hypotension in the past several weeks documented and one contributing to vasovagal syncope after getting out the warm shower   She should only check her BP before medications if she feels symptomatically low, otherwise take medications as below     2. Essential hypertension  Hypertensive   Simplify regime   Stop taking coreg TID, start taking coreg 6.25 mg BID   Take amlodipine 2.5 mg BID   Hydralazine if SBP over 170 mmHg     3. Mixed hyperlipidemia  Continue zetia and simvastatin            Follow up as previously scheduled     Naomi Peters, PA-C [facility]   Office: [phone]

## 2023-12-26 NOTE — PROGRESS NOTES
Subjective:    Patient ID:  Jazlyn Shay is a 78 y.o. female who presents for follow-up of No chief complaint on file.      TELEMEDICINE VISIT      Problem List Items Addressed This Visit          Cardiac/Vascular    Essential hypertension - Primary    Labile hypertension    Mixed hyperlipidemia       HPI    Patient was last seen on 06/07/2023 at which time she was doing okay from a cardiac standpoint.    Patient presents for telemedicine visit.    On assessment today, the patient states that her trend of symptoms are disturbing.   BP lability is a challenge for her, but has been OK for the last 5 days.  BP highs and lows causing significant issues    Home BP - Still some highs and lows, but less often the last few days.   Frequency of hydralazine use - 10x a month with higher dosing needed now  Getting lows occasionally as well, 90/67 this morning, associated with lightheadedness, tries salt and caffeine to increase BP    Had recent mechanical fall, lightheadedness has been worse since falls, has had 2 CTs that were OK thankfully.       Objective:   There were no vitals filed for this visit.    BP Readings from Last 5 Encounters:   12/21/23 (!) 156/84   06/07/23 122/76   05/15/23 118/74   12/01/22 (!) 144/78   08/12/21 (!) 192/101        Physical Exam  Constitutional:       General: She is not in acute distress.     Appearance: She is well-developed. She is not diaphoretic.   HENT:      Head: Normocephalic and atraumatic.   Eyes:      General: No scleral icterus.     Conjunctiva/sclera: Conjunctivae normal.   Pulmonary:      Effort: No respiratory distress.      Breath sounds: No stridor.   Musculoskeletal:         General: No signs of injury.      Cervical back: Normal range of motion.   Skin:     Coloration: Skin is not jaundiced.   Neurological:      Mental Status: She is alert. Mental status is at baseline.   Psychiatric:         Mood and Affect: Mood normal.         Behavior: Behavior normal.          "    Current Outpatient Medications   Medication Instructions    ALPRAZolam (XANAX) 0.25 MG tablet No dose, route, or frequency recorded.    amLODIPine (NORVASC) 5 mg, Oral, 2 times daily, Half twice a day    ascorbic acid (vitamin C) (VITAMIN C) 100 mg, Oral, Daily    carvediloL (COREG) 6.25 mg, Oral, 2 times daily with meals    EScitalopram oxalate (LEXAPRO) 20 mg, Oral, Daily    ezetimibe (ZETIA) 10 mg, Oral, Daily    hydrALAZINE (APRESOLINE) 25 mg, Oral, Once as needed    latanoprost 0.005 % ophthalmic solution No dose, route, or frequency recorded.    MULTIVITAMIN ORAL Oral    simvastatin (ZOCOR) 20 MG tablet No dose, route, or frequency recorded.       Lipid Panel:   No results found for: "CHOL", "HDL", "LDLCALC", "TRIG", "CHOLHDL"    The ASCVD Risk score (Bakari DK, et al., 2019) failed to calculate for the following reasons:    Cannot find a previous HDL lab    Cannot find a previous total cholesterol lab    All pertinent labs, imaging, and EKGs reviewed.  Patient's most recent EKG tracing was personally interpreted by this provider.    Most Recent EKG Results  Results for orders placed or performed in visit on 08/12/21   IN OFFICE EKG 12-LEAD (to [place])    Collection Time: 08/12/21  2:34 PM    Narrative    Test Reason : 8/24    Vent. Rate : 094 BPM     Atrial Rate : 094 BPM     P-R Int : 172 ms          QRS Dur : 074 ms      QT Int : 362 ms       P-R-T Axes : 059 062 023 degrees     QTc Int : 452 ms    Normal sinus rhythm  Nonspecific T wave abnormality  Cannot exclude Ischemia  Abnormal ECG  No previous ECGs available  Confirmed by BERNY MUJICA MD ([id number]) on 8/24/2021 3:29:46 PM    Referred By: [email or web address]   SELF           Confirmed By:BERNY MUJICA MD       Most Recent Echocardiogram Results  Results for orders placed in visit on 05/17/23    Echo    Interpretation Summary  · The left ventricle is normal in size with normal systolic function.  · The estimated ejection fraction is 60%.  · Normal left " ventricular diastolic function.  · Normal right ventricular size with normal right ventricular systolic function.  · Normal central venous pressure (3 mmHg).  · The estimated PA systolic pressure is 24 mmHg.      Most Recent Nuclear Stress Test Results  No results found for this or any previous visit.      Most Recent Cardiac PET Stress Test Results  No results found for this or any previous visit.      Most Recent Cardiovascular Angiogram results  No results found for this or any previous visit.      Other Most Recent Cardiology Results  Results for orders placed in visit on 09/16/21    CV US Renal Artery Stenosis Hypertension Complete    Narrative  · There is insignificant stenosis (0-59%) in the Right Renal Artery.  · Elevated right renal resistive index suggestive of intrinsic kidney disease.  · Right kidney 8.90 cm.  · There is insignificant stenosis (0-59%) in the Left Renal Artery.  · Left kidney 9.50 cm.      Assessment:       1. Essential hypertension    2. Labile hypertension    3. Mixed hyperlipidemia         Plan:     Symptoms related to continued labile BP  BP/Pulse unable to be assessed on telemedicine visit  Most recent echocardiogram reviewed personally    Has Hx of vestibular nerve damage that may be contributing to lightheadedness    Continue amlodipine 2.5 mg PO Daily  Continue carvedilol 6.25 mg PO BID  Continue escitalopram 20 mg PO Daily   Continue Zetia 10 mg PO Daily   Continue hydralazine 50 mg PO t.i.d. PRN systolic blood pressure persistently greater than 165   Continue simvastatin 20 mg PO Daily  Refer to Dr. Bijan Ferguson for 2nd opinion on blood pressure   Home BP log   Trigger diary    Continue other cardiac medications  Mediterranean Diet/Cardiovascular Exercise Program    Patient queried and all questions were answered.    Greater than 8 minutes were spent in counseling/evaluation of appropriate diet and exercise and/or discussion of available laboratory/diagnostic results.    Greater  than 40 minutes of total time was spent for this patient on the date of the encounter including chart review, interview, and medical decision making.    F/u in 6 months to reassess    The patient location is: [place]   The chief complaint leading to consultation is:  Please see problem list above  Visit type: Virtual visit with synchronous audio and video  Total time spent with patient: 15 minutes   Each patient to whom he or she provides medical services by telemedicine is:  (1) informed of the relationship between the physician and patient and the respective role of any other health care provider with respect to management of the patient; and (2) notified that he or she may decline to receive medical services by telemedicine and may withdraw from such care at any time.    Notes: See above       Signed:    Gerhard Cobos MD  12/26/2023 10:35 AM      Statement Selected

## 2024-01-05 ENCOUNTER — OFFICE VISIT (OUTPATIENT)
Dept: CARDIOLOGY | Facility: CLINIC | Age: 79
End: 2024-01-05
Payer: MEDICARE

## 2024-01-05 ENCOUNTER — PATIENT MESSAGE (OUTPATIENT)
Dept: CARDIOLOGY | Facility: CLINIC | Age: 79
End: 2024-01-05
Payer: MEDICARE

## 2024-01-05 DIAGNOSIS — I10 ESSENTIAL HYPERTENSION: Primary | ICD-10-CM

## 2024-01-05 DIAGNOSIS — R09.89 LABILE HYPERTENSION: ICD-10-CM

## 2024-01-05 DIAGNOSIS — E78.2 MIXED HYPERLIPIDEMIA: ICD-10-CM

## 2024-01-05 PROCEDURE — 99215 OFFICE O/P EST HI 40 MIN: CPT | Mod: 95,,, | Performed by: INTERNAL MEDICINE

## 2024-01-10 ENCOUNTER — TELEPHONE (OUTPATIENT)
Dept: NEPHROLOGY | Facility: CLINIC | Age: 79
End: 2024-01-10

## 2024-01-10 NOTE — TELEPHONE ENCOUNTER
----- Message from Arin Guallpa LPN sent at 1/5/2024 10:51 AM CST -----  Hello. Dr Cobos has referred Ms Shay to Dr Bijan Ferguson. Dr Ferguson told him he will start seeing patients at the end of the month and Dr Cobos would like her added to his schedule.    Thanks,    ALVARADO Dao  [facility]  Ext [phone]

## 2024-02-06 ENCOUNTER — OFFICE VISIT (OUTPATIENT)
Dept: NEPHROLOGY | Facility: CLINIC | Age: 79
End: 2024-02-06
Payer: MEDICARE

## 2024-02-06 VITALS
WEIGHT: 112.44 LBS | BODY MASS INDEX: 22.07 KG/M2 | HEIGHT: 60 IN | HEART RATE: 73 BPM | OXYGEN SATURATION: 97 % | SYSTOLIC BLOOD PRESSURE: 140 MMHG | DIASTOLIC BLOOD PRESSURE: 52 MMHG

## 2024-02-06 DIAGNOSIS — M81.0 OSTEOPOROSIS, UNSPECIFIED OSTEOPOROSIS TYPE, UNSPECIFIED PATHOLOGICAL FRACTURE PRESENCE: ICD-10-CM

## 2024-02-06 DIAGNOSIS — R09.89 LABILE HYPERTENSION: Primary | ICD-10-CM

## 2024-02-06 DIAGNOSIS — I10 ESSENTIAL HYPERTENSION: ICD-10-CM

## 2024-02-06 PROCEDURE — 99213 OFFICE O/P EST LOW 20 MIN: CPT | Mod: PBBFAC,PN | Performed by: STUDENT IN AN ORGANIZED HEALTH CARE EDUCATION/TRAINING PROGRAM

## 2024-02-06 PROCEDURE — 99999 PR PBB SHADOW E&M-EST. PATIENT-LVL III: CPT | Mod: PBBFAC,,, | Performed by: STUDENT IN AN ORGANIZED HEALTH CARE EDUCATION/TRAINING PROGRAM

## 2024-02-06 PROCEDURE — 99205 OFFICE O/P NEW HI 60 MIN: CPT | Mod: S$PBB,,, | Performed by: STUDENT IN AN ORGANIZED HEALTH CARE EDUCATION/TRAINING PROGRAM

## 2024-02-06 RX ORDER — DORZOLAMIDE HCL 20 MG/ML
1 SOLUTION/ DROPS OPHTHALMIC 2 TIMES DAILY
COMMUNITY
Start: 2024-01-31

## 2024-02-06 RX ORDER — BRIMONIDINE TARTRATE AND TIMOLOL MALEATE 2; 5 MG/ML; MG/ML
1 SOLUTION OPHTHALMIC
COMMUNITY

## 2024-02-06 NOTE — PATIENT INSTRUCTIONS
If AM blood pressure is systolic 90mmHg or less, hold carvedilol until systolic blood pressure returns to > 120mmHg prior to restart. If lasting all day, hold evening dose of amlodipine as well.

## 2024-02-06 NOTE — PROGRESS NOTES
"  Subjective:       Patient ID: Jazlyn Shay is a 78 y.o. White female who presents for new patient evaluation for labile hypertensive disease    Jazlyn Shay is referred by Marty Almazan MD to be evaluated for labile hypertensive disease. She has a medical history particularly notable for osteoporosis tx with denosumab, Meniere disease as well as hypertension. Ms. Shay reports this has been a problem over the last 4 years. She was previously being seen and evaluated by cardiology for this problem. Reports every few weeks she develops three days of difficult to control hypertension. Most recently suffered an event over 1/31 - 2/2 which required an ED visit in [place] for sustained BP over 200mmHg systolic. She has tried multiple different agents for BP control previously, including anxiolytics. She does have a PRN hydralazine dose for elevated readings, however is afraid to take this due to a prior episode of "bottoming out". To make her BP control more complicated, she also has a period of about 1-2 days per month when her BP is hypotensive, typically 90/60s with symptoms. She feels debilitated and unable to participate in any activities on those days. She occasionally reports chest palpitations and feels the palpitations happen on the days she is hypotensive, but not exclusively. She denies wearing an outpatient holter monitor.     In terms of her hypertension workup - most w/u appears to have been completed outside of system. She did have a renal doppler review in 2021, which showed insignificant stenosis of the kidneys bilaterally. I do not see a leidy/renin or metanephrine panel to review. She does recall taking a 24hr urine test in the last year but does not remember the test type or that it was positive (I suspect this was for metanephrines). She denies issues with heat/cold intolerance, issues with recent anesthesia, or night sweats. Doubtful +metanephrine.    In terms of her renal " history - renal ultrasound from 2021 shows a 9.5 cm L kidney and 8.5 cm R kidney. She denies issues with kidney(s) previously or hospitalizations for FAHEEM or req RRT. Last chemistry panel available is from 12/23/23 and sCr was 0.8, with eGFR of 72ml/min/1.73^2.      SECOND complaint today is in regards to her osteoporosis. She has completed 5 rounds or denosumab and is wanting to know if she is renally cleared for zolendronic acid.       Review of Systems   Constitutional:  Negative for chills, diaphoresis, fatigue and fever.   HENT:  Negative for congestion, hearing loss, rhinorrhea, sinus pressure and sore throat.    Eyes:  Negative for photophobia, pain and discharge.   Respiratory:  Negative for cough, shortness of breath and wheezing.    Cardiovascular:  Negative for chest pain, palpitations and leg swelling.   Gastrointestinal:  Negative for abdominal distention, abdominal pain, diarrhea, nausea and vomiting.   Endocrine: Negative for cold intolerance, polydipsia and polyuria.   Genitourinary:  Positive for difficulty urinating (incontinence). Negative for dysuria, flank pain and frequency.   Musculoskeletal:  Negative for arthralgias, back pain and joint swelling.   Skin:  Negative for pallor and rash.   Allergic/Immunologic: Negative for immunocompromised state.   Neurological:  Negative for dizziness, weakness, light-headedness and headaches.   Psychiatric/Behavioral:  Negative for agitation, behavioral problems, confusion and hallucinations.        The past medical, family and social histories were reviewed for this encounter.     No past medical history on file.  No past surgical history on file.  Social History     Socioeconomic History    Marital status:    Tobacco Use    Smoking status: Never    Smokeless tobacco: Never     Social Determinants of Health     Financial Resource Strain: Low Risk  (1/4/2024)    Overall Financial Resource Strain (CARDIA)     Difficulty of Paying Living Expenses: Not  hard at all   Food Insecurity: No Food Insecurity (1/4/2024)    Hunger Vital Sign     Worried About Running Out of Food in the Last Year: Never true     Ran Out of Food in the Last Year: Never true   Transportation Needs: No Transportation Needs (1/4/2024)    PRAPARE - Transportation     Lack of Transportation (Medical): No     Lack of Transportation (Non-Medical): No   Physical Activity: Unknown (1/4/2024)    Exercise Vital Sign     Days of Exercise per Week: Patient declined     Minutes of Exercise per Session: 20 min   Stress: No Stress Concern Present (1/4/2024)    [employer] [place] [employer] - Occupational Stress Questionnaire     Feeling of Stress : Only a little   Social Connections: Unknown (1/4/2024)    Social Connection and Isolation Panel [NHANES]     Frequency of Communication with Friends and Family: More than three times a week     Frequency of Social Gatherings with Friends and Family: Three times a week     Active Member of Clubs or Organizations: Yes     Attends Club or Organization Meetings: More than 4 times per year     Marital Status:    Housing Stability: Low Risk  (1/4/2024)    Housing Stability Vital Sign     Unable to Pay for Housing in the Last Year: No     Number of Places Lived in the Last Year: 1     Unstable Housing in the Last Year: No     Current Outpatient Medications   Medication Sig    ALPRAZolam (XANAX) 0.25 MG tablet     amLODIPine (NORVASC) 5 MG tablet Take 5 mg by mouth 2 (two) times a day. Half twice a day    ascorbic acid, vitamin C, (VITAMIN C) 100 MG tablet Take 100 mg by mouth once daily.    carvediloL (COREG) 6.25 MG tablet Take 6.25 mg by mouth 3 (three) times daily.    EScitalopram oxalate (LEXAPRO) 20 MG tablet Take 20 mg by mouth once daily.    ezetimibe (ZETIA) 10 mg tablet Take 10 mg by mouth once daily.     MULTIVITAMIN ORAL Take by mouth.    simvastatin (ZOCOR) 20 MG tablet     brimonidine-timoloL (COMBIGAN) 0.2-0.5 % Drop Place 1 drop into  both eyes.    dorzolamide (TRUSOPT) 2 % ophthalmic solution Place 1 drop into the left eye 2 (two) times daily.    hydrALAZINE (APRESOLINE) 25 MG tablet Take 25 mg by mouth every 8 (eight) hours as needed (systolic BP > 160mmHg, or diastolic BP > 95mmHg. Wait two hours prior to recheck).    latanoprost 0.005 % ophthalmic solution      No current facility-administered medications for this visit.     BP (!) 140/52 (BP Location: Right arm, Patient Position: Sitting, BP Method: Medium (Manual))   Pulse 73   Ht 5' (1.524 m)   Wt 51 kg (112 lb 7 oz)   SpO2 97%   BMI 21.96 kg/m²     Objective:      Physical Exam  Vitals reviewed.   Constitutional:       General: She is not in acute distress.     Appearance: Normal appearance.   HENT:      Head: Normocephalic and atraumatic.      Right Ear: External ear normal.      Left Ear: External ear normal.      Nose: Nose normal. No congestion.      Mouth/Throat:      Mouth: Mucous membranes are moist.      Pharynx: Oropharynx is clear. No oropharyngeal exudate or posterior oropharyngeal erythema.   Eyes:      General: No scleral icterus.     Extraocular Movements: Extraocular movements intact.   Cardiovascular:      Rate and Rhythm: Normal rate and regular rhythm.      Pulses: Normal pulses.      Heart sounds: Normal heart sounds.      No friction rub.   Pulmonary:      Effort: Pulmonary effort is normal. No respiratory distress.      Breath sounds: Normal breath sounds.   Abdominal:      General: Abdomen is flat.      Palpations: Abdomen is soft.   Musculoskeletal:         General: No swelling.      Cervical back: Normal range of motion. No tenderness.      Right lower leg: No edema.      Left lower leg: No edema.   Neurological:      General: No focal deficit present.      Mental Status: She is oriented to person, place, and time.      Motor: No weakness.   Psychiatric:         Mood and Affect: Mood normal.         Behavior: Behavior normal.         Assessment:       1.  "Labile hypertension    2. Essential hypertension    3. Osteoporosis, unspecified osteoporosis type, unspecified pathological fracture presence        No results found for: "CREATININE", "BUN", "NA", "K", "CL", "CO2"  No results found for: "PTH", "CALCIUM", "CAION", "PHOS"  No results found for: "HCT"  No results found for: "UTPCR"    No results found for: "MICALBCREAT"  Plan:       Labile Hypertension  -complex case with complex history  -provided patient with email to send all available prior workup  -continue BP log with detailed diary on days when an event occurs (High/low BP)  -low sodium diet (<2g per day)  -maintain hydration  -pharmacologic interventions:   -continue carvedilol 6.25mg TID for now (hold if BP systolic <90mmHg until rises above 120mmHg)   -continue amlodipine 5mg tablet nightly for now   -change hydralazine to PRN 25mg for SBP >160mmHg or DBP >95mmHg  -f/u urine Na, urine Cr  -f/u leidy/renin, f/u urine metanephrines    Osteoporosis  -no improvement at 4 rounds of prolia injections  -no renal contraindications for Z.A.  -discuss with endocrinology, patient may benefit from bone biopsy prior to Z.A. dosing.    Return to clinic in 4-6 weeks via telemedicine.  Labs for next visit include UA, UACR, UPCR, urine Na, urine Ca, urine Cre, renin/leidy, plasma metanephrine.  Baseline creatinine is 0.8mg/dL.    Pasquale Ferguson MD  [facility] [place]  "

## 2024-04-18 ENCOUNTER — TELEPHONE (OUTPATIENT)
Dept: NEPHROLOGY | Facility: CLINIC | Age: 79
End: 2024-04-18
Payer: MEDICARE

## 2024-04-18 NOTE — TELEPHONE ENCOUNTER
----- Message from Tio Brownlee sent at 4/18/2024  8:51 AM CDT -----  Type: Needs Medical Advice  Who Called:  pt  Symptoms (please be specific):  pt said she need to speak to the office--said she need to update that her BP was way up high this morning--said she been in the hospital and broke her leg and have blood clots since she seen the dr--please call and advise  Best Call Back Number: [phone] (home)     Additional Information: thank you

## 2024-04-19 DIAGNOSIS — I10 ESSENTIAL HYPERTENSION: Primary | ICD-10-CM

## 2024-04-19 DIAGNOSIS — E78.5 HYPERLIPIDEMIA, UNSPECIFIED HYPERLIPIDEMIA TYPE: ICD-10-CM

## 2024-04-19 RX ORDER — APIXABAN 5 MG/1
5 TABLET, FILM COATED ORAL 2 TIMES DAILY
COMMUNITY

## 2024-04-19 RX ORDER — HYDRALAZINE HYDROCHLORIDE 25 MG/1
75 TABLET, FILM COATED ORAL EVERY 8 HOURS PRN
Qty: 180 TABLET | Refills: 3 | Status: SHIPPED | OUTPATIENT
Start: 2024-04-19 | End: 2024-07-18

## 2024-04-19 RX ORDER — ATORVASTATIN CALCIUM 10 MG/1
10 TABLET, FILM COATED ORAL DAILY
Qty: 90 TABLET | Refills: 3 | Status: SHIPPED | OUTPATIENT
Start: 2024-04-19 | End: 2025-04-19

## 2024-04-19 NOTE — TELEPHONE ENCOUNTER
Patient called and is concerned with her blood pressure. It has been running around 200/100 for the last 3 days. Patient is taking the hydralazine 2 times a day along with the other blood pressure medicines. Patient has been unable to come to her follow ups due to a broken leg. I am sending over her lab orders from her last visit via e mail. I am sending this message to  . Please advise. I am also sending this message to [facility] for rescheduling. Please call patient to schedule. I also advised patient to go to the ED if she felt like she should due to her elevated blood pressure.

## 2024-04-19 NOTE — PROGRESS NOTES
Nephrology Plan of Care Note    Pt: Jazlyn Shay  : 1945  Date: 2024      Patient called with complaint of difficult to control hypertension.  She reports her blood pressure has been running in the 200s over 110s at home.  She has not completed her pre visit lab work or returned to clinic since last visit due to her recently having a fall and breaking her leg.    We discussed her blood pressure logs over the phone.  For now, we will continue her beta blocker and calcium channel blocker at the same dose.  We will change her p.r.n. hydralazine from 25 mg to 50 mg every 8 hours as needed with parameters.  Also gave instruction increase the hydralazine to 75 mg every 8 hours with parameters if blood pressure still difficult to control.    We discussed interaction between simvastatin and amlodipine and change her simvastatin to atorvastatin.    I called in a refill for additional hydralazine and atorvastatin to the [facility] in [facility].      Pasquale Ferguson MD  [facility] [place]

## 2024-06-27 ENCOUNTER — TELEPHONE (OUTPATIENT)
Dept: NEPHROLOGY | Facility: CLINIC | Age: 79
End: 2024-06-27
Payer: MEDICARE

## 2024-11-14 ENCOUNTER — TELEPHONE (OUTPATIENT)
Dept: NEPHROLOGY | Facility: CLINIC | Age: 79
End: 2024-11-14
Payer: MEDICARE

## 2024-11-14 DIAGNOSIS — I10 ESSENTIAL HYPERTENSION: Primary | ICD-10-CM

## 2024-11-14 NOTE — TELEPHONE ENCOUNTER
Called and spoke to patient. Patient needed to schedule lab appointment. New lab appointment scheduled for 11/18/24.

## 2024-11-14 NOTE — TELEPHONE ENCOUNTER
----- Message from Mayank sent at 11/14/2024  3:09 PM CST -----  Contact: Self  Type: Needs Medical Advice    Who Called:  Patient    Best Call Back Number: [phone]    Additional Information: Patient is requesting lab orders prior to her appt on 11/21, And she asks to please let her know when the orders have been put in.

## 2024-11-18 ENCOUNTER — LAB VISIT (OUTPATIENT)
Dept: LAB | Facility: HOSPITAL | Age: 79
End: 2024-11-18
Attending: STUDENT IN AN ORGANIZED HEALTH CARE EDUCATION/TRAINING PROGRAM
Payer: MEDICARE

## 2024-11-18 DIAGNOSIS — I10 ESSENTIAL HYPERTENSION: ICD-10-CM

## 2024-11-18 LAB
ALBUMIN SERPL BCP-MCNC: 3.8 G/DL (ref 3.5–5.2)
ALP SERPL-CCNC: 74 U/L (ref 40–150)
ALT SERPL W/O P-5'-P-CCNC: 19 U/L (ref 10–44)
ANION GAP SERPL CALC-SCNC: 8 MMOL/L (ref 8–16)
AST SERPL-CCNC: 23 U/L (ref 10–40)
BACTERIA #/AREA URNS HPF: ABNORMAL /HPF
BILIRUB SERPL-MCNC: 0.4 MG/DL (ref 0.1–1)
BILIRUB UR QL STRIP: NEGATIVE
BUN SERPL-MCNC: 20 MG/DL (ref 8–23)
CALCIUM SERPL-MCNC: 10 MG/DL (ref 8.7–10.5)
CALCIUM UR-MCNC: 28.2 MG/DL (ref 0–15)
CAOX CRY URNS QL MICRO: ABNORMAL
CHLORIDE SERPL-SCNC: 105 MMOL/L (ref 95–110)
CLARITY UR: CLEAR
CO2 SERPL-SCNC: 27 MMOL/L (ref 23–29)
COLOR UR: YELLOW
CREAT SERPL-MCNC: 0.8 MG/DL (ref 0.5–1.4)
CREAT UR-MCNC: 177.9 MG/DL (ref 15–325)
EST. GFR  (NO RACE VARIABLE): >60 ML/MIN/1.73 M^2
GLUCOSE SERPL-MCNC: 97 MG/DL (ref 70–110)
GLUCOSE UR QL STRIP: NEGATIVE
HGB UR QL STRIP: ABNORMAL
HYALINE CASTS #/AREA URNS LPF: 4 /LPF
KETONES UR QL STRIP: NEGATIVE
LEUKOCYTE ESTERASE UR QL STRIP: ABNORMAL
MAGNESIUM SERPL-MCNC: 2.2 MG/DL (ref 1.6–2.6)
MICROSCOPIC COMMENT: ABNORMAL
NITRITE UR QL STRIP: NEGATIVE
PH UR STRIP: 6 [PH] (ref 5–8)
PHOSPHATE SERPL-MCNC: 3.8 MG/DL (ref 2.7–4.5)
POTASSIUM SERPL-SCNC: 4.1 MMOL/L (ref 3.5–5.1)
PROT SERPL-MCNC: 6.6 G/DL (ref 6–8.4)
PROT UR QL STRIP: ABNORMAL
RBC #/AREA URNS HPF: 3 /HPF (ref 0–4)
SODIUM SERPL-SCNC: 140 MMOL/L (ref 136–145)
SODIUM UR-SCNC: 62 MMOL/L (ref 20–250)
SP GR UR STRIP: 1.02 (ref 1–1.03)
URN SPEC COLLECT METH UR: ABNORMAL
UROBILINOGEN UR STRIP-ACNC: NEGATIVE EU/DL
WBC #/AREA URNS HPF: 10 /HPF (ref 0–5)
WBC CLUMPS URNS QL MICRO: ABNORMAL

## 2024-11-18 PROCEDURE — 82570 ASSAY OF URINE CREATININE: CPT | Performed by: STUDENT IN AN ORGANIZED HEALTH CARE EDUCATION/TRAINING PROGRAM

## 2024-11-18 PROCEDURE — 81000 URINALYSIS NONAUTO W/SCOPE: CPT | Performed by: STUDENT IN AN ORGANIZED HEALTH CARE EDUCATION/TRAINING PROGRAM

## 2024-11-18 PROCEDURE — 83735 ASSAY OF MAGNESIUM: CPT | Performed by: STUDENT IN AN ORGANIZED HEALTH CARE EDUCATION/TRAINING PROGRAM

## 2024-11-18 PROCEDURE — 82340 ASSAY OF CALCIUM IN URINE: CPT | Performed by: STUDENT IN AN ORGANIZED HEALTH CARE EDUCATION/TRAINING PROGRAM

## 2024-11-18 PROCEDURE — 36415 COLL VENOUS BLD VENIPUNCTURE: CPT | Performed by: STUDENT IN AN ORGANIZED HEALTH CARE EDUCATION/TRAINING PROGRAM

## 2024-11-18 PROCEDURE — 84100 ASSAY OF PHOSPHORUS: CPT | Performed by: STUDENT IN AN ORGANIZED HEALTH CARE EDUCATION/TRAINING PROGRAM

## 2024-11-18 PROCEDURE — 80053 COMPREHEN METABOLIC PANEL: CPT | Performed by: STUDENT IN AN ORGANIZED HEALTH CARE EDUCATION/TRAINING PROGRAM

## 2024-11-18 PROCEDURE — 84300 ASSAY OF URINE SODIUM: CPT | Performed by: STUDENT IN AN ORGANIZED HEALTH CARE EDUCATION/TRAINING PROGRAM

## 2024-11-21 ENCOUNTER — OFFICE VISIT (OUTPATIENT)
Dept: NEPHROLOGY | Facility: CLINIC | Age: 79
End: 2024-11-21
Payer: MEDICARE

## 2024-11-21 VITALS — SYSTOLIC BLOOD PRESSURE: 164 MMHG | DIASTOLIC BLOOD PRESSURE: 86 MMHG

## 2024-11-21 DIAGNOSIS — E78.5 HYPERLIPIDEMIA, UNSPECIFIED HYPERLIPIDEMIA TYPE: ICD-10-CM

## 2024-11-21 DIAGNOSIS — N39.41 URGE INCONTINENCE OF URINE: ICD-10-CM

## 2024-11-21 DIAGNOSIS — N20.0 NEPHROLITHIASIS: Primary | ICD-10-CM

## 2024-11-21 DIAGNOSIS — M81.0 OSTEOPOROSIS, UNSPECIFIED OSTEOPOROSIS TYPE, UNSPECIFIED PATHOLOGICAL FRACTURE PRESENCE: ICD-10-CM

## 2024-11-21 DIAGNOSIS — R09.89 LABILE HYPERTENSION: ICD-10-CM

## 2024-11-21 PROCEDURE — 99213 OFFICE O/P EST LOW 20 MIN: CPT | Mod: PBBFAC,PN | Performed by: STUDENT IN AN ORGANIZED HEALTH CARE EDUCATION/TRAINING PROGRAM

## 2024-11-21 PROCEDURE — 99214 OFFICE O/P EST MOD 30 MIN: CPT | Mod: S$PBB,,, | Performed by: STUDENT IN AN ORGANIZED HEALTH CARE EDUCATION/TRAINING PROGRAM

## 2024-11-21 PROCEDURE — 99999 PR PBB SHADOW E&M-EST. PATIENT-LVL III: CPT | Mod: PBBFAC,,, | Performed by: STUDENT IN AN ORGANIZED HEALTH CARE EDUCATION/TRAINING PROGRAM

## 2024-11-21 RX ORDER — VIBEGRON 75 MG/1
75 TABLET, FILM COATED ORAL DAILY
Qty: 30 TABLET | Refills: 5 | Status: SHIPPED | OUTPATIENT
Start: 2024-11-21

## 2024-11-21 RX ORDER — BIMATOPROST 0.1 MG/ML
SOLUTION/ DROPS OPHTHALMIC
COMMUNITY
Start: 2024-10-26

## 2024-11-21 RX ORDER — POTASSIUM CITRATE 5 MEQ/1
10 TABLET, EXTENDED RELEASE ORAL 2 TIMES DAILY WITH MEALS
Qty: 120 TABLET | Refills: 11 | Status: SHIPPED | OUTPATIENT
Start: 2024-11-21 | End: 2025-11-21

## 2024-11-21 NOTE — PROGRESS NOTES
"  Subjective:    HPI:   Patient ID: Jazlyn Shay is a 78 y.o. female who returns for ongoing evaluation and management of CKD.     She has a medical history particularly notable for osteoporosis tx with denosumab, Meniere disease as well as hypertension. Ms. Shay reports this has been a problem over the last 4 years. She was previously being seen and evaluated by cardiology for this problem. Reports every few weeks she develops three days of difficult to control hypertension. Most recently suffered an event over 1/31 - 2/2 which required an ED visit in [place] for sustained BP over 200mmHg systolic. She has tried multiple different agents for BP control previously, including anxiolytics. She does have a PRN hydralazine dose for elevated readings, however is afraid to take this due to a prior episode of "bottoming out". To make her BP control more complicated, she also has a period of about 1-2 days per month when her BP is hypotensive, typically 90/60s with symptoms. She feels debilitated and unable to participate in any activities on those days. She occasionally reports chest palpitations and feels the palpitations happen on the days she is hypotensive, but not exclusively. She denies wearing an outpatient holter monitor.     In terms of her hypertension workup - most w/u appears to have been completed outside of system. She did have a renal doppler review in 2021, which showed insignificant stenosis of the kidneys bilaterally. I do not see a leidy/renin or metanephrine panel to review. She does recall taking a 24hr urine test in the last year but does not remember the test type or that it was positive (I suspect this was for metanephrines). She denies issues with heat/cold intolerance, issues with recent anesthesia, or night sweats. Doubtful +metanephrine.    In terms of her renal history - renal ultrasound from 2021 shows a 9.5 cm L kidney and 8.5 cm R kidney. She denies issues with kidney(s) " previously or hospitalizations for FAHEEM or req RRT. Last chemistry panel available is from 12/23/23 and sCr was 0.8, with eGFR of 72ml/min/1.73^2.      SECOND complaint today is in regards to her osteoporosis. She has completed 5 rounds or denosumab and is wanting to know if she is renally cleared for zolendronic acid.     Interval history:  11/21/24 - here for f/u. BP has been better controlled in the interim with amlodipine and carvedilol combination. She has PRN hydralazine but rarely takes it (once per month).  We reviewed her blood pressure logs from home at chair side, systolic ranging from 100-130 mmHg over 60-80 mmHg.  Minimal outlier events.  We reviewed her labs at chair side.  Urinalysis notable for presence of calcium oxalate crystals.  We will start her on citrate supplementation to avoid propagation of calcium based stones.  She still reports issues with urge incontinence.  She has a prescription of Gemtesa has a but is not currently taking, we will refill today.    Review of Systems   Constitutional:  Negative for chills, diaphoresis and fever.   Respiratory:  Negative for cough and shortness of breath.    Cardiovascular:  Negative for chest pain and leg swelling.   Gastrointestinal:  Negative for abdominal pain, diarrhea, nausea and vomiting.   Genitourinary:  Positive for difficulty urinating. Negative for dysuria and hematuria.   Musculoskeletal:  Negative for myalgias.   Neurological:  Negative for headaches.   Hematological:  Does not bruise/bleed easily.       The past medical, family and social histories were reviewed for this encounter.     Current Outpatient Medications   Medication Sig    ALPRAZolam (XANAX) 0.25 MG tablet     amLODIPine (NORVASC) 5 MG tablet Take 5 mg by mouth 2 (two) times a day. Half twice a day    atorvastatin (LIPITOR) 10 MG tablet Take 1 tablet (10 mg total) by mouth once daily.    carvediloL (COREG) 6.25 MG tablet Take 6.25 mg by mouth 2 (two) times daily with meals.     ELIQUIS 5 mg Tab Take 5 mg by mouth 2 (two) times daily.    EScitalopram oxalate (LEXAPRO) 20 MG tablet Take 20 mg by mouth once daily.    ezetimibe (ZETIA) 10 mg tablet Take 10 mg by mouth once daily.     hydrALAZINE (APRESOLINE) 25 MG tablet Take 3 tablets (75 mg total) by mouth every 8 (eight) hours as needed (systolic BP > 160mmHg, or diastolic BP > 95mmHg. Wait two hours prior to recheck).    LUMIGAN 0.01 % Drop Place into both eyes.    MULTIVITAMIN ORAL Take by mouth.    ascorbic acid, vitamin C, (VITAMIN C) 100 MG tablet Take 100 mg by mouth once daily. (Patient not taking: Reported on 11/21/2024)    brimonidine-timoloL (COMBIGAN) 0.2-0.5 % Drop Place 1 drop into both eyes. (Patient not taking: Reported on 11/21/2024)    dorzolamide (TRUSOPT) 2 % ophthalmic solution Place 1 drop into the left eye 2 (two) times daily. (Patient not taking: Reported on 11/21/2024)    potassium citrate (UROCIT-K) 5 mEq (540 mg) TbSR Take 2 tablets (10 mEq total) by mouth 2 (two) times daily with meals.    vibegron (GEMTESA) 75 mg Tab Take 1 tablet (75 mg total) by mouth once daily. Does not take on a regular basis     No current facility-administered medications for this visit.     BP (!) 164/86 (BP Location: Right arm, Patient Position: Sitting)     Objective:      Physical Exam  Vitals reviewed.   Constitutional:       General: She is not in acute distress.     Appearance: Normal appearance.   Cardiovascular:      Rate and Rhythm: Normal rate and regular rhythm.      Pulses: Normal pulses.      Heart sounds: Normal heart sounds.      No friction rub.   Pulmonary:      Effort: Pulmonary effort is normal. No respiratory distress.      Breath sounds: Normal breath sounds.   Abdominal:      General: Abdomen is flat.      Palpations: Abdomen is soft.   Musculoskeletal:         General: No swelling.      Right lower leg: No edema.      Left lower leg: No edema.   Neurological:      General: No focal deficit present.      Mental  "Status: She is oriented to person, place, and time.      Motor: No weakness.   Psychiatric:         Mood and Affect: Mood normal.         Behavior: Behavior normal.         Assessment:       1. Nephrolithiasis    2. Labile hypertension    3. Hyperlipidemia, unspecified hyperlipidemia type    4. Osteoporosis, unspecified osteoporosis type, unspecified pathological fracture presence    5. Urge incontinence of urine          Lab Results   Component Value Date    CREATININE 0.8 11/18/2024    BUN 20 11/18/2024     11/18/2024    K 4.1 11/18/2024     11/18/2024    CO2 27 11/18/2024     Lab Results   Component Value Date    CALCIUM 10.0 11/18/2024    PHOS 3.8 11/18/2024     No results found for: "HCT"  No results found for: "UTPCR"    No results found for: "MICALBCREAT"  Plan:   Return to clinic in 3 months  Labs for next visit include UA, UACR, UPCR, pth, RFP  Baseline creatinine is 0.8mg/dL.    Labile Hypertension  -complex case with complex history  -provided patient with email to send all available prior workup  -continue BP log with detailed diary on days when an event occurs (High/low BP)  -low sodium diet (<2g per day)  -maintain hydration  -pharmacologic interventions:   -continue carvedilol 6.25mg b.i.d.   -continue amlodipine 2.5 mg b.i.d. (patient prefers to take twice daily instead of 5 mg daily)   -change hydralazine to PRN 25mg for SBP >160mmHg or DBP >95mmHg  -continue home blood pressure log monitoring    Osteoporosis  -received reclast infusion in February.     Abnormal urinalysis  -ft ca oxalate crystals. Start K citrate supplement.    Bladder incontinence, urge incontinence  -add back Gemtesa.       Pasquale Ferguson MD  [facility]    "

## 2025-02-13 ENCOUNTER — LAB VISIT (OUTPATIENT)
Dept: LAB | Facility: CLINIC | Age: 80
End: 2025-02-13
Attending: STUDENT IN AN ORGANIZED HEALTH CARE EDUCATION/TRAINING PROGRAM
Payer: MEDICARE

## 2025-02-13 DIAGNOSIS — N20.0 NEPHROLITHIASIS: ICD-10-CM

## 2025-02-13 DIAGNOSIS — R09.89 LABILE HYPERTENSION: ICD-10-CM

## 2025-02-13 LAB
ALBUMIN SERPL BCP-MCNC: 3.9 G/DL (ref 3.5–5.2)
ALBUMIN/CREAT UR: 29.2 UG/MG (ref 0–30)
ANION GAP SERPL CALC-SCNC: 8 MMOL/L (ref 8–16)
BILIRUB UR QL STRIP: NEGATIVE
BUN SERPL-MCNC: 16 MG/DL (ref 8–23)
CALCIUM SERPL-MCNC: 9.9 MG/DL (ref 8.7–10.5)
CHLORIDE SERPL-SCNC: 103 MMOL/L (ref 95–110)
CLARITY UR: ABNORMAL
CO2 SERPL-SCNC: 28 MMOL/L (ref 23–29)
COLOR UR: YELLOW
CREAT SERPL-MCNC: 0.8 MG/DL (ref 0.5–1.4)
CREAT UR-MCNC: 51.4 MG/DL (ref 15–325)
CREAT UR-MCNC: 51.4 MG/DL (ref 15–325)
EST. GFR  (NO RACE VARIABLE): >60 ML/MIN/1.73 M^2
GLUCOSE SERPL-MCNC: 92 MG/DL (ref 70–110)
GLUCOSE UR QL STRIP: NEGATIVE
HGB UR QL STRIP: ABNORMAL
KETONES UR QL STRIP: NEGATIVE
LEUKOCYTE ESTERASE UR QL STRIP: ABNORMAL
MICROALBUMIN UR DL<=1MG/L-MCNC: 15 UG/ML
MICROSCOPIC COMMENT: ABNORMAL
NITRITE UR QL STRIP: NEGATIVE
PH UR STRIP: 6 [PH] (ref 5–8)
PHOSPHATE SERPL-MCNC: 3.2 MG/DL (ref 2.7–4.5)
POTASSIUM SERPL-SCNC: 4 MMOL/L (ref 3.5–5.1)
PROT UR QL STRIP: NEGATIVE
PROT UR-MCNC: <7 MG/DL (ref 0–15)
PROT/CREAT UR: NORMAL MG/G{CREAT} (ref 0–0.2)
PTH-INTACT SERPL-MCNC: 59.7 PG/ML (ref 9–77)
RBC #/AREA URNS HPF: 3 /HPF (ref 0–4)
SODIUM SERPL-SCNC: 139 MMOL/L (ref 136–145)
SP GR UR STRIP: 1.01 (ref 1–1.03)
SQUAMOUS #/AREA URNS HPF: 2 /HPF
URN SPEC COLLECT METH UR: ABNORMAL
UROBILINOGEN UR STRIP-ACNC: NEGATIVE EU/DL
WBC #/AREA URNS HPF: 10 /HPF (ref 0–5)

## 2025-02-13 PROCEDURE — 81000 URINALYSIS NONAUTO W/SCOPE: CPT | Performed by: STUDENT IN AN ORGANIZED HEALTH CARE EDUCATION/TRAINING PROGRAM

## 2025-02-13 PROCEDURE — 82043 UR ALBUMIN QUANTITATIVE: CPT | Performed by: STUDENT IN AN ORGANIZED HEALTH CARE EDUCATION/TRAINING PROGRAM

## 2025-02-13 PROCEDURE — 82570 ASSAY OF URINE CREATININE: CPT | Performed by: STUDENT IN AN ORGANIZED HEALTH CARE EDUCATION/TRAINING PROGRAM

## 2025-02-13 PROCEDURE — 83970 ASSAY OF PARATHORMONE: CPT | Performed by: STUDENT IN AN ORGANIZED HEALTH CARE EDUCATION/TRAINING PROGRAM

## 2025-02-13 PROCEDURE — 80069 RENAL FUNCTION PANEL: CPT | Performed by: STUDENT IN AN ORGANIZED HEALTH CARE EDUCATION/TRAINING PROGRAM

## 2025-02-20 ENCOUNTER — OFFICE VISIT (OUTPATIENT)
Dept: NEPHROLOGY | Facility: CLINIC | Age: 80
End: 2025-02-20
Payer: MEDICARE

## 2025-02-20 VITALS
SYSTOLIC BLOOD PRESSURE: 146 MMHG | HEIGHT: 60 IN | HEART RATE: 80 BPM | BODY MASS INDEX: 22.07 KG/M2 | OXYGEN SATURATION: 99 % | DIASTOLIC BLOOD PRESSURE: 78 MMHG | WEIGHT: 112.44 LBS

## 2025-02-20 DIAGNOSIS — M81.0 OSTEOPOROSIS, UNSPECIFIED OSTEOPOROSIS TYPE, UNSPECIFIED PATHOLOGICAL FRACTURE PRESENCE: ICD-10-CM

## 2025-02-20 DIAGNOSIS — N20.0 NEPHROLITHIASIS: ICD-10-CM

## 2025-02-20 DIAGNOSIS — R09.89 LABILE HYPERTENSION: Primary | ICD-10-CM

## 2025-02-20 DIAGNOSIS — E78.5 HYPERLIPIDEMIA, UNSPECIFIED HYPERLIPIDEMIA TYPE: ICD-10-CM

## 2025-02-20 PROCEDURE — 99213 OFFICE O/P EST LOW 20 MIN: CPT | Mod: PBBFAC,PN | Performed by: STUDENT IN AN ORGANIZED HEALTH CARE EDUCATION/TRAINING PROGRAM

## 2025-02-20 RX ORDER — BRINZOLAMIDE/BRIMONIDINE TARTRATE 10; 2 MG/ML; MG/ML
1 SUSPENSION/ DROPS OPHTHALMIC 2 TIMES DAILY
COMMUNITY
Start: 2024-12-22

## 2025-02-20 NOTE — PROGRESS NOTES
"[facility]    Subjective:       HPI ID: Jazlyn Shay is a 79 y.o. female who returns for ongoing evaluation and management of CKD.     She has a medical history particularly notable for osteoporosis tx with denosumab, Meniere disease as well as hypertension. Ms. Shay reports this has been a problem over the last 4 years. She was previously being seen and evaluated by cardiology for this problem. Reports every few weeks she develops three days of difficult to control hypertension. Most recently suffered an event over 1/31 - 2/2 which required an ED visit in [place] for sustained BP over 200mmHg systolic. She has tried multiple different agents for BP control previously, including anxiolytics. She does have a PRN hydralazine dose for elevated readings, however is afraid to take this due to a prior episode of "bottoming out". To make her BP control more complicated, she also has a period of about 1-2 days per month when her BP is hypotensive, typically 90/60s with symptoms. She feels debilitated and unable to participate in any activities on those days. She occasionally reports chest palpitations and feels the palpitations happen on the days she is hypotensive, but not exclusively. She denies wearing an outpatient holter monitor.     In terms of her hypertension workup - most w/u appears to have been completed outside of system. She did have a renal doppler review in 2021, which showed insignificant stenosis of the kidneys bilaterally. I do not see a leidy/renin or metanephrine panel to review. She does recall taking a 24hr urine test in the last year but does not remember the test type or that it was positive (I suspect this was for metanephrines). She denies issues with heat/cold intolerance, issues with recent anesthesia, or night sweats. Doubtful +metanephrine.    In terms of her renal history - renal ultrasound from 2021 shows a 9.5 cm L kidney and 8.5 cm R " kidney. She denies issues with kidney(s) previously or hospitalizations for FAHEEM or req RRT. Last chemistry panel available is from 12/23/23 and sCr was 0.8, with eGFR of 72ml/min/1.73^2.      SECOND complaint today is in regards to her osteoporosis. She has completed 5 rounds or denosumab and is wanting to know if she is renally cleared for zolendronic acid.     Interval history:  11/21/24 - here for f/u. BP has been better controlled in the interim with amlodipine and carvedilol combination. She has PRN hydralazine but rarely takes it (once per month).  We reviewed her blood pressure logs from home at chair side, systolic ranging from 100-130 mmHg over 60-80 mmHg.  Minimal outlier events.  We reviewed her labs at chair side.  Urinalysis notable for presence of calcium oxalate crystals.  We will start her on citrate supplementation to avoid propagation of calcium based stones.  She still reports issues with urge incontinence.  She has a prescription of Gemtesa has a but is not currently taking, we will refill today.    02/20/25 - here for labile hypertension follow up.  She feels well today denies acute complaints.  We reviewed her blood pressure logs from home.  No reported blood pressure spikes since last visit.  She reports not having to use her p.r.n. hydralazine since last visit.  Stable blood pressure trends on the current regimen.  She does have occasional low blood pressure episodes with associated lightheadedness and drinks water during those sessions to recovery.  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  She is on apixaban X 1 year.    Review of Systems   Constitutional:  Negative for chills, diaphoresis and fever.   Respiratory:  Negative for cough and shortness of breath.    Cardiovascular:  Negative for chest pain and leg swelling.   Gastrointestinal:  Negative for abdominal pain, diarrhea, nausea and vomiting.    Genitourinary:  Positive for difficulty urinating. Negative for dysuria and hematuria.   Musculoskeletal:  Negative for myalgias.   Neurological:  Negative for headaches.   Hematological:  Does not bruise/bleed easily.       The past medical, family and social histories were reviewed for this encounter.     Current Outpatient Medications   Medication Sig    ALPRAZolam (XANAX) 0.25 MG tablet     amLODIPine (NORVASC) 5 MG tablet Take 5 mg by mouth 2 (two) times a day. Half twice a day    ascorbic acid, vitamin C, (VITAMIN C) 100 MG tablet Take 100 mg by mouth once daily.    atorvastatin (LIPITOR) 10 MG tablet Take 1 tablet (10 mg total) by mouth once daily.    carvediloL (COREG) 6.25 MG tablet Take 6.25 mg by mouth 2 (two) times daily with meals.    ELIQUIS 5 mg Tab Take 5 mg by mouth 2 (two) times daily.    EScitalopram oxalate (LEXAPRO) 20 MG tablet Take 20 mg by mouth once daily.    ezetimibe (ZETIA) 10 mg tablet Take 10 mg by mouth once daily.     hydrALAZINE (APRESOLINE) 25 MG tablet Take 3 tablets (75 mg total) by mouth every 8 (eight) hours as needed (systolic BP > 160mmHg, or diastolic BP > 95mmHg. Wait two hours prior to recheck).    LUMIGAN 0.01 % Drop Place into both eyes.    MULTIVITAMIN ORAL Take by mouth.    potassium citrate (UROCIT-K) 5 mEq (540 mg) TbSR Take 2 tablets (10 mEq total) by mouth 2 (two) times daily with meals.    SIMBRINZA 1-0.2 % DrpS Place 1 drop into the left eye 2 (two) times daily.    vibegron (GEMTESA) 75 mg Tab Take 1 tablet (75 mg total) by mouth once daily. Does not take on a regular basis     No current facility-administered medications for this visit.     BP (!) 146/78 (BP Location: Left arm, Patient Position: Sitting)   Pulse 80   Ht 5' (1.524 m)   Wt 51 kg (112 lb 7 oz)   SpO2 99%   BMI 21.96 kg/m²     Objective:      Physical Exam  Vitals reviewed.   Constitutional:       General: She is not in acute distress.     Appearance: Normal appearance.   Cardiovascular:       "Pulses: Normal pulses.      Heart sounds: Normal heart sounds.      No friction rub.   Pulmonary:      Effort: Pulmonary effort is normal. No respiratory distress.      Breath sounds: Normal breath sounds.   Abdominal:      General: Abdomen is flat.      Palpations: Abdomen is soft.   Musculoskeletal:         General: No swelling.      Right lower leg: No edema.      Left lower leg: No edema.   Neurological:      General: No focal deficit present.      Mental Status: She is oriented to person, place, and time.      Motor: No weakness.   Psychiatric:         Mood and Affect: Mood normal.         Behavior: Behavior normal.         Assessment:       1. Labile hypertension    2. Nephrolithiasis    3. Osteoporosis, unspecified osteoporosis type, unspecified pathological fracture presence    4. Hyperlipidemia, unspecified hyperlipidemia type            Lab Results   Component Value Date    CREATININE 0.8 02/13/2025    BUN 16 02/13/2025     02/13/2025    K 4.0 02/13/2025     02/13/2025    CO2 28 02/13/2025     Lab Results   Component Value Date    PTH 59.7 02/13/2025    CALCIUM 9.9 02/13/2025    PHOS 3.2 02/13/2025     No results found for: "HCT"  Prot/Creat Ratio, Urine   Date Value Ref Range Status   02/13/2025 Unable to calculate 0.00 - 0.20 Final       Lab Results   Component Value Date    MICALBCREAT 29.2 02/13/2025     Plan:   Return to clinic in 6 months  Labs for next visit include CBC, RF P, UA CR  Baseline creatinine is 0.8mg/dL.    Labile Hypertension  -complex case with complex history  -continue BP log with detailed diary on days when an event occurs (High/low BP)  -low sodium diet (<2g per day)  -maintain hydration  -pharmacologic interventions:   -continue carvedilol 6.25mg b.i.d.   -continue amlodipine 2.5 mg b.i.d. (patient prefers to take twice daily instead of 5 mg daily)   -continue hydralazine to PRN 25mg for SBP >160mmHg or DBP >95mmHg. Can take an additional tablet after one hour if fails " to correct.  -continue home blood pressure log monitoring    Osteoporosis  -on outpatient Reclast infusion    Abnormal urinalysis  -ft ca oxalate crystals. Check RPUS for stone presence. Not taking K citrate.     Bladder incontinence, urge incontinence  -Gemtesa.     DVT Lower extremity  -on apixaban.     __________________________  Pasquale Ferguson MD  [facility] [place]    Part of this note has been created using [employer] managementation system. Errors in transcription may not be completely avoided.

## 2025-03-10 ENCOUNTER — HOSPITAL ENCOUNTER (OUTPATIENT)
Dept: RADIOLOGY | Facility: HOSPITAL | Age: 80
Discharge: HOME | End: 2025-03-10
Attending: STUDENT IN AN ORGANIZED HEALTH CARE EDUCATION/TRAINING PROGRAM
Payer: MEDICARE

## 2025-03-10 DIAGNOSIS — N20.0 NEPHROLITHIASIS: ICD-10-CM

## 2025-03-10 PROCEDURE — 76770 US EXAM ABDO BACK WALL COMP: CPT | Mod: TC

## 2025-03-10 PROCEDURE — 76770 US EXAM ABDO BACK WALL COMP: CPT | Mod: 26,,, | Performed by: RADIOLOGY

## 2025-04-21 DIAGNOSIS — E78.5 HYPERLIPIDEMIA, UNSPECIFIED HYPERLIPIDEMIA TYPE: ICD-10-CM

## 2025-04-21 RX ORDER — ATORVASTATIN CALCIUM 10 MG/1
10 TABLET, FILM COATED ORAL DAILY
Qty: 90 TABLET | Refills: 1 | Status: SHIPPED | OUTPATIENT
Start: 2025-04-21 | End: 2025-10-18